# Patient Record
Sex: FEMALE | Race: BLACK OR AFRICAN AMERICAN | Employment: FULL TIME | URBAN - METROPOLITAN AREA
[De-identification: names, ages, dates, MRNs, and addresses within clinical notes are randomized per-mention and may not be internally consistent; named-entity substitution may affect disease eponyms.]

---

## 2021-03-01 ENCOUNTER — OFFICE VISIT (OUTPATIENT)
Dept: SURGERY | Age: 66
End: 2021-03-01
Payer: COMMERCIAL

## 2021-03-01 VITALS
HEART RATE: 76 BPM | SYSTOLIC BLOOD PRESSURE: 139 MMHG | BODY MASS INDEX: 35.85 KG/M2 | HEIGHT: 64 IN | WEIGHT: 210 LBS | DIASTOLIC BLOOD PRESSURE: 79 MMHG | TEMPERATURE: 96.9 F

## 2021-03-01 DIAGNOSIS — N60.99 ATYPICAL HYPERPLASIA OF BREAST: Primary | ICD-10-CM

## 2021-03-01 PROCEDURE — 99243 OFF/OP CNSLTJ NEW/EST LOW 30: CPT | Performed by: SURGERY

## 2021-03-01 PROCEDURE — 76642 ULTRASOUND BREAST LIMITED: CPT | Performed by: SURGERY

## 2021-03-01 RX ORDER — BISMUTH SUBSALICYLATE 262 MG
1 TABLET,CHEWABLE ORAL DAILY
COMMUNITY
End: 2021-09-27

## 2021-03-01 NOTE — PATIENT INSTRUCTIONS
Mammogram: About This Test 
What is it? A mammogram is an X-ray of the breast that is used to screen for breast cancer. This test can find tumors that are too small for you or your doctor to feel. Cancer is most easily treated when it is found at an early stage. Why is this test done? A mammogram is done to: 
· Look for breast cancer in women who don't have symptoms. · Find breast cancer in women who have symptoms. Symptoms of breast cancer may include a lump or thickening in the breast, nipple discharge, or dimpling of the skin on one area of the breast. 
· Find an area of suspicious breast tissue to remove for an exam under a microscope (biopsy). How do you prepare for the test? 
If you've had a mammogram before at another clinic, have the results sent or bring them with you to your appointment. On the day of the mammogram, don't use any deodorant. And don't use perfume, powders, or ointments near or on your breasts. The residue left on your skin by these substances may interfere with the X-rays. How is the test done? · You will need to take off any jewelry that might interfere with the X-ray pictures. · You will need to take off your clothes above the waist. 
· You will be given a cloth or paper gown to use during the test. 
· You probably will stand during the mammogram. 
· One at a time, your breasts will be placed on a flat plate. · Another plate is then pressed firmly against your breast to help flatten out the breast tissue. You may be asked to lift your arm. · For a few seconds while the X-ray picture is being taken, you will need to hold your breath. · At least two pictures are taken of each breast. One is taken from the top and one from the side. How does having a mammogram feel? A mammogram is often uncomfortable but rarely painful.  If you have sensitive or fragile skin or a skin condition, let the technician know before you have your exam. If you have menstrual periods, the procedure is more comfortable when done within 2 weeks after your period has ended. Having your breasts flattened is usually uncomfortable, but it helps the technician get the best images. How long does the test take? · The test will take about 10 to 15 minutes. You may be in the clinic for up to an hour. · You may be asked to wait a few minutes while the images are checked to make sure they don't need to be redone. What happens after the test? 
· You will probably be able to go home right away. · You can go back to your usual activities right away. Follow-up care is a key part of your treatment and safety. Be sure to make and go to all appointments, and call your doctor if you are having problems. It's also a good idea to keep a list of the medicines you take. Ask your doctor when you can expect to have your test results. Where can you learn more? Go to http://www.gray.com/ Enter Y978 in the search box to learn more about \"Mammogram: About This Test.\" Current as of: April 29, 2020               Content Version: 12.6 © 1689-1059 "[employer]. Care instructions adapted under license by [employer]. (which disclaims liability or warranty for this information). If you have questions about a medical condition or this instruction, always ask your healthcare professional. [address] any warranty or liability for your use of this information.

## 2021-03-01 NOTE — LETTER
3/24/2021    Patient: Dino Pack   YOB: 1955   Date of Visit: 3/1/2021     Keenan Dexter MD  [address] [facility] [facility]  [address]  [facility] [address]  Via Fax: [phone]     Merle Gray MD  [address] [address]  Via [address] [employer]    Dear MD Merle Perez MD,      Thank you for referring Ms. Alise Jones to [address] [facility] OFF[facility] [address] [place] [address] for evaluation. My notes for this consultation are attached. If you have questions, please do not hesitate to call me. I look forward to following your patient along with you.       Sincerely,    Sukhi Machado MD

## 2021-03-01 NOTE — PROGRESS NOTES
HISTORY OF PRESENT ILLNESS  Jordan Caba is a 73 y.o. female. HPI NEW Patient presents for consultation at the request of Dr. Art Tian for RIGHT breast atypical intraductal papillary lesion. She did not have any abnormal breast symptoms to report. This area was detected on mammogram which led to [address] and subsequent biopsy. Biopsy site is healing. History of RIGHT breast biopsy done in 2019, benign per patient. 1/29/2021 - RIGHT breast biopsy revealed atypical intraductal papillary lesion. Family history-    Breast imaging-  1/21/2021 - RIGHT breast US done at [facility]: BI-RADS 4a. 1.1 cm complex cystic mass in the right breast is at a low suspicion for malignancy. An ultrasound biopsy is recommended. 1/13/2021 - screening mammogram at [facility]: BI-RADS 0. 1 cm oval focal asymmetry in the right breast resembles a cyst or a post-procedural seroma (given biopsy close to this location) is indeterminate. An ultrasound is recommended. History reviewed. No pertinent past medical history. History reviewed. No pertinent surgical history.   Social History     Socioeconomic History    Marital status:      Spouse name: Not on file    Number of children: Not on file    Years of education: Not on file    Highest education level: Not on file   Occupational History    Not on file   Social Needs    Financial resource strain: Not on file    Food insecurity     Worry: Not on file     Inability: Not on file    Transportation needs     Medical: Not on file     Non-medical: Not on file   Tobacco Use    Smoking status: Never Smoker    Smokeless tobacco: Never Used   Substance and Sexual Activity    Alcohol use: Not Currently     Frequency: Never     Binge frequency: Never    Drug use: Not on file    Sexual activity: Not on file   Lifestyle    Physical activity     Days per week: Not on file     Minutes per session: Not on file    Stress: Not on file   Relationships    Social connections     Talks on phone: Not on file     Gets together: Not on file     Attends [employer]ish service: Not on file     Active member of club or organization: Not on file     Attends meetings of clubs or organizations: Not on file     Relationship status: Not on file    Intimate partner violence     Fear of current or ex partner: Not on file     Emotionally abused: Not on file     Physically abused: Not on file     Forced sexual activity: Not on file   Other Topics Concern    Not on file   Social History Narrative    Not on file     OB History    No obstetric history on file. Obstetric Comments   Menarche:  16. LMP: ? .  # of Children:  3. Age at Delivery of First Child:  23.   Hysterectomy/oophorectomy:  NO/NO. Breast Bx:  Yes right breast 2019. Hx of Breast Feeding:  no. BCP:  yes. Hormone therapy:  no.                  Current Outpatient Medications:     multivitamin (ONE A DAY) tablet, Take 1 Tab by mouth daily. , Disp: , Rfl:     cholecalciferol, vitamin D3, (VITAMIN D3 PO), Take  by mouth., Disp: , Rfl:     flaxseed oil (OMEGA 3 PO), Take  by mouth., Disp: , Rfl:   No current facility-administered medications for this visit. Allergies   Allergen Reactions    Pcn [Penicillins] Rash    Seafood Rash     Review of Systems   Constitutional: Negative. HENT: Negative. Eyes: Negative. Respiratory: Negative. Cardiovascular: Negative. Gastrointestinal: Negative. Genitourinary: Negative. Musculoskeletal: Negative. Skin: Negative. Neurological: Negative. Endo/Heme/Allergies: Negative. Psychiatric/Behavioral: Negative. All other systems reviewed and are negative. Physical Exam  Vitals signs and nursing note reviewed. Constitutional:       Appearance: She is well-developed. HENT:      Head: Normocephalic. Neck:      Musculoskeletal: Neck supple. Thyroid: No thyromegaly. Cardiovascular:      Rate and Rhythm: Normal rate and regular rhythm. Heart sounds: Normal heart sounds. Pulmonary:      Effort: Pulmonary effort is normal.      Breath sounds: Normal breath sounds. Chest:      Breasts: Breasts are symmetrical.         Right: No inverted nipple, mass, nipple discharge, skin change or tenderness. Left: No inverted nipple, mass, nipple discharge, skin change or tenderness. Abdominal:      Palpations: Abdomen is soft. Musculoskeletal: Normal range of motion. Lymphadenopathy:      Cervical: No cervical adenopathy. Skin:     General: Skin is warm and dry. Neurological:      Mental Status: She is alert and oriented to person, place, and time. BREAST ULTRASOUND, Preop planning  Indication:preop planning  right Breast 9:00   Technique: The area was scanned using a high-frequency linear-array near-field transducer  Findings: most recent clip see   Impression: Biopsy site visible with ultrasound  Disposition:  Will schedule mri    ASSESSMENT and PLAN    ICD-10-CM ICD-9-CM    1. Atypical hyperplasia of breast  N60.99 611.1      74 yo female with right breast atypical papillary lesion  Had biopsy before that was also a papilloma. Will get breast mri  Both need excision. I will call her after mri  She was happy with plan  40 minutes was spent with patient on counseling and coordination of care.

## 2021-03-04 ENCOUNTER — TELEPHONE (OUTPATIENT)
Dept: SURGERY | Age: 66
End: 2021-03-04

## 2021-03-04 NOTE — TELEPHONE ENCOUNTER
Returned pt's call. Sounds like she is playing phone tag with Dr. Reggie Aparicio. No answer. I left her a message with office call back # and also advised that she try [employer] to message us.

## 2021-03-05 ENCOUNTER — DOCUMENTATION ONLY (OUTPATIENT)
Dept: SURGERY | Age: 66
End: 2021-03-05

## 2021-03-05 NOTE — PROGRESS NOTES
Brought outside breast imaging CDs ([phone] Sharath Yang) up to [facility] to be uploaded into pacs.

## 2021-03-23 ENCOUNTER — HOSPITAL ENCOUNTER (OUTPATIENT)
Dept: MRI IMAGING | Age: 66
Discharge: HOME | End: 2021-03-23
Attending: SURGERY
Payer: COMMERCIAL

## 2021-03-23 VITALS — WEIGHT: 218 LBS | BODY MASS INDEX: 37.42 KG/M2

## 2021-03-23 DIAGNOSIS — Z91.89 AT HIGH RISK FOR BREAST CANCER: ICD-10-CM

## 2021-03-23 DIAGNOSIS — N60.99 ATYPICAL HYPERPLASIA OF BREAST: ICD-10-CM

## 2021-03-23 PROCEDURE — 74011250636 HC RX REV CODE- 250/636: Performed by: SURGERY

## 2021-03-23 PROCEDURE — 74011000258 HC RX REV CODE- 258: Performed by: SURGERY

## 2021-03-23 PROCEDURE — A9585 GADOBUTROL INJECTION: HCPCS | Performed by: SURGERY

## 2021-03-23 PROCEDURE — 77030021566 MRI BREAST BI W WO CONT

## 2021-03-23 RX ORDER — SODIUM CHLORIDE 0.9 % (FLUSH) 0.9 %
10 SYRINGE (ML) INJECTION
Status: COMPLETED | OUTPATIENT
Start: 2021-03-23 | End: 2021-03-23

## 2021-03-23 RX ADMIN — SODIUM CHLORIDE 100 ML: 900 INJECTION, SOLUTION INTRAVENOUS at 13:37

## 2021-03-23 RX ADMIN — GADOBUTROL 10 ML: 604.72 INJECTION INTRAVENOUS at 13:37

## 2021-03-23 RX ADMIN — Medication 10 ML: at 13:38

## 2021-03-29 NOTE — PROGRESS NOTES
Spoke with patient   2 papillomas right   Spoke with bruno castillo  Clips 2 cm apart  Recommend magseed excisional biopsy x 2 through one incision  Patient was happy with plan.

## 2021-04-14 ENCOUNTER — DOCUMENTATION ONLY (OUTPATIENT)
Dept: SURGERY | Age: 66
End: 2021-04-14

## 2021-04-14 DIAGNOSIS — N60.99 BREAST DUCTAL HYPERPLASIA, ATYPICAL: Primary | ICD-10-CM

## 2021-04-15 DIAGNOSIS — N60.99 DUCTAL HYPERPLASIA, ATYPICAL, BREAST: Primary | ICD-10-CM

## 2021-04-19 ENCOUNTER — HOSPITAL ENCOUNTER (OUTPATIENT)
Dept: PREADMISSION TESTING | Age: 66
Discharge: HOME | End: 2021-04-19
Payer: COMMERCIAL

## 2021-04-19 VITALS
SYSTOLIC BLOOD PRESSURE: 114 MMHG | TEMPERATURE: 98 F | BODY MASS INDEX: 37.15 KG/M2 | HEART RATE: 82 BPM | RESPIRATION RATE: 16 BRPM | WEIGHT: 217.59 LBS | HEIGHT: 64 IN | DIASTOLIC BLOOD PRESSURE: 76 MMHG

## 2021-04-19 LAB
BASOPHILS # BLD: 0 K/UL (ref 0–0.1)
BASOPHILS NFR BLD: 0 % (ref 0–1)
DIFFERENTIAL METHOD BLD: NORMAL
EOSINOPHIL # BLD: 0.1 K/UL (ref 0–0.4)
EOSINOPHIL NFR BLD: 2 % (ref 0–7)
ERYTHROCYTE [DISTWIDTH] IN BLOOD BY AUTOMATED COUNT: 13.8 % (ref 11.5–14.5)
HCT VFR BLD AUTO: 39.5 % (ref 35–47)
HGB BLD-MCNC: 12.4 G/DL (ref 11.5–16)
IMM GRANULOCYTES # BLD AUTO: 0 K/UL (ref 0–0.04)
IMM GRANULOCYTES NFR BLD AUTO: 0 % (ref 0–0.5)
LYMPHOCYTES # BLD: 1.5 K/UL (ref 0.8–3.5)
LYMPHOCYTES NFR BLD: 25 % (ref 12–49)
MCH RBC QN AUTO: 29.5 PG (ref 26–34)
MCHC RBC AUTO-ENTMCNC: 31.4 G/DL (ref 30–36.5)
MCV RBC AUTO: 93.8 FL (ref 80–99)
MONOCYTES # BLD: 0.5 K/UL (ref 0–1)
MONOCYTES NFR BLD: 9 % (ref 5–13)
NEUTS SEG # BLD: 3.7 K/UL (ref 1.8–8)
NEUTS SEG NFR BLD: 64 % (ref 32–75)
NRBC # BLD: 0 K/UL (ref 0–0.01)
NRBC BLD-RTO: 0 PER 100 WBC
PLATELET # BLD AUTO: 177 K/UL (ref 150–400)
PMV BLD AUTO: 11.5 FL (ref 8.9–12.9)
RBC # BLD AUTO: 4.21 M/UL (ref 3.8–5.2)
WBC # BLD AUTO: 5.8 K/UL (ref 3.6–11)

## 2021-04-19 PROCEDURE — 85025 COMPLETE CBC W/AUTO DIFF WBC: CPT

## 2021-04-19 PROCEDURE — 36415 COLL VENOUS BLD VENIPUNCTURE: CPT

## 2021-04-19 NOTE — PERIOP NOTES
Patient given surgical site infection FAQs handout and hand hygiene tips sheet. Pre-operative instructions reviewed and patient verbalizes understanding of instructions. Patient has been given the opportunity to ask additional questions. Pt given 2 bottles of CHG soap and instructed in use. Kassandra Mcnamara Pt instructed that they will be scheduled for COVID 19 test 4 days prior to surgery. COVID instruction sheet and instructions given to PT. Pt instructed they must self quarantine between  COVID 19 test and day of surgery. Parking deck instructions  given to patient. Instructions reviewed with patient.

## 2021-04-23 ENCOUNTER — TRANSCRIBE ORDER (OUTPATIENT)
Dept: REGISTRATION | Age: 66
End: 2021-04-23

## 2021-04-23 ENCOUNTER — HOSPITAL ENCOUNTER (OUTPATIENT)
Dept: MAMMOGRAPHY | Age: 66
Discharge: HOME | End: 2021-04-23
Attending: SURGERY
Payer: COMMERCIAL

## 2021-04-23 ENCOUNTER — HOSPITAL ENCOUNTER (OUTPATIENT)
Dept: PREADMISSION TESTING | Age: 66
Discharge: HOME | End: 2021-04-23
Payer: COMMERCIAL

## 2021-04-23 DIAGNOSIS — Z01.812 PRE-PROCEDURE LAB EXAM: ICD-10-CM

## 2021-04-23 DIAGNOSIS — Z01.812 PRE-PROCEDURE LAB EXAM: Primary | ICD-10-CM

## 2021-04-23 DIAGNOSIS — R92.8 ABNORMAL MAMMOGRAM: ICD-10-CM

## 2021-04-23 PROCEDURE — A4648 IMPLANTABLE TISSUE MARKER: HCPCS

## 2021-04-23 PROCEDURE — 19283 PERQ DEV BREAST 1ST STRTCTC: CPT

## 2021-04-23 PROCEDURE — 74011000250 HC RX REV CODE- 250: Performed by: RADIOLOGY

## 2021-04-23 PROCEDURE — U0003 INFECTIOUS AGENT DETECTION BY NUCLEIC ACID (DNA OR RNA); SEVERE ACUTE RESPIRATORY SYNDROME CORONAVIRUS 2 (SARS-COV-2) (CORONAVIRUS DISEASE [COVID-19]), AMPLIFIED PROBE TECHNIQUE, MAKING USE OF HIGH THROUGHPUT TECHNOLOGIES AS DESCRIBED BY CMS-2020-01-R: HCPCS

## 2021-04-23 RX ORDER — LIDOCAINE HYDROCHLORIDE 10 MG/ML
10 INJECTION INFILTRATION; PERINEURAL
Status: COMPLETED | OUTPATIENT
Start: 2021-04-23 | End: 2021-04-23

## 2021-04-23 RX ADMIN — LIDOCAINE HYDROCHLORIDE 10 ML: 10 INJECTION, SOLUTION INFILTRATION; PERINEURAL at 10:30

## 2021-04-23 NOTE — PROGRESS NOTES
Patient tolerated right breast mag seed placement x 2 well with scant bleeding. Sites were covered with gauze and tegaderm. Patient was instructed to keep the area clean and dry for 24 hours. Other care instructions were reviewed with the patient and she was provided with a written copy as well. Encouraged her to call with any questions or concerns.

## 2021-04-24 LAB — SARS-COV-2, COV2NT: NOT DETECTED

## 2021-04-27 ENCOUNTER — ANESTHESIA EVENT (OUTPATIENT)
Dept: MEDSURG UNIT | Age: 66
End: 2021-04-27
Payer: COMMERCIAL

## 2021-04-27 ENCOUNTER — ANESTHESIA (OUTPATIENT)
Dept: MEDSURG UNIT | Age: 66
End: 2021-04-27
Payer: COMMERCIAL

## 2021-04-27 ENCOUNTER — APPOINTMENT (OUTPATIENT)
Dept: MAMMOGRAPHY | Age: 66
End: 2021-04-27
Attending: SURGERY
Payer: COMMERCIAL

## 2021-04-27 ENCOUNTER — HOSPITAL ENCOUNTER (OUTPATIENT)
Age: 66
Setting detail: OUTPATIENT SURGERY
Discharge: HOME | End: 2021-04-27
Attending: SURGERY | Admitting: SURGERY
Payer: COMMERCIAL

## 2021-04-27 VITALS
BODY MASS INDEX: 37.05 KG/M2 | OXYGEN SATURATION: 98 % | WEIGHT: 217 LBS | TEMPERATURE: 97.9 F | HEIGHT: 64 IN | HEART RATE: 72 BPM | SYSTOLIC BLOOD PRESSURE: 118 MMHG | DIASTOLIC BLOOD PRESSURE: 80 MMHG | RESPIRATION RATE: 18 BRPM

## 2021-04-27 DIAGNOSIS — N60.99 BREAST DUCTAL HYPERPLASIA, ATYPICAL: ICD-10-CM

## 2021-04-27 DIAGNOSIS — D24.1 PAPILLOMA OF RIGHT BREAST: Primary | ICD-10-CM

## 2021-04-27 PROCEDURE — 77030031139 HC SUT VCRL2 J&J -A: Performed by: SURGERY

## 2021-04-27 PROCEDURE — 76030000001 HC AMB SURG OR TIME 1 TO 1.5: Performed by: SURGERY

## 2021-04-27 PROCEDURE — 77030002933 HC SUT MCRYL J&J -A: Performed by: SURGERY

## 2021-04-27 PROCEDURE — C9290 INJ, BUPIVACAINE LIPOSOME: HCPCS | Performed by: SURGERY

## 2021-04-27 PROCEDURE — 74011250636 HC RX REV CODE- 250/636: Performed by: SURGERY

## 2021-04-27 PROCEDURE — 77030041680 HC PNCL ELECSURG SMK EVAC CNMD -B: Performed by: SURGERY

## 2021-04-27 PROCEDURE — 77030008684 HC TU ET CUF COVD -B: Performed by: ANESTHESIOLOGY

## 2021-04-27 PROCEDURE — 74011250636 HC RX REV CODE- 250/636: Performed by: ANESTHESIOLOGY

## 2021-04-27 PROCEDURE — 76210000034 HC AMBSU PH I REC 0.5 TO 1 HR: Performed by: SURGERY

## 2021-04-27 PROCEDURE — 88341 IMHCHEM/IMCYTCHM EA ADD ANTB: CPT

## 2021-04-27 PROCEDURE — 19125 EXCISION BREAST LESION: CPT | Performed by: SURGERY

## 2021-04-27 PROCEDURE — 77030040922 HC BLNKT HYPOTHRM STRY -A

## 2021-04-27 PROCEDURE — 74011000250 HC RX REV CODE- 250: Performed by: NURSE ANESTHETIST, CERTIFIED REGISTERED

## 2021-04-27 PROCEDURE — 74011250636 HC RX REV CODE- 250/636: Performed by: NURSE ANESTHETIST, CERTIFIED REGISTERED

## 2021-04-27 PROCEDURE — 88342 IMHCHEM/IMCYTCHM 1ST ANTB: CPT

## 2021-04-27 PROCEDURE — 2709999900 HC NON-CHARGEABLE SUPPLY: Performed by: SURGERY

## 2021-04-27 PROCEDURE — 77030026438 HC STYL ET INTUB CARD -A: Performed by: ANESTHESIOLOGY

## 2021-04-27 PROCEDURE — 2709999900 HC NON-CHARGEABLE SUPPLY

## 2021-04-27 PROCEDURE — 88307 TISSUE EXAM BY PATHOLOGIST: CPT

## 2021-04-27 PROCEDURE — 76060000062 HC AMB SURG ANES 1 TO 1.5 HR: Performed by: SURGERY

## 2021-04-27 PROCEDURE — 77030040361 HC SLV COMPR DVT MDII -B: Performed by: SURGERY

## 2021-04-27 PROCEDURE — 88360 TUMOR IMMUNOHISTOCHEM/MANUAL: CPT

## 2021-04-27 PROCEDURE — 77030011267 HC ELECTRD BLD COVD -A: Performed by: SURGERY

## 2021-04-27 PROCEDURE — 74011000250 HC RX REV CODE- 250: Performed by: SURGERY

## 2021-04-27 PROCEDURE — 77030010507 HC ADH SKN DERMBND J&J -B: Performed by: SURGERY

## 2021-04-27 RX ORDER — SODIUM CHLORIDE 0.9 % (FLUSH) 0.9 %
5-40 SYRINGE (ML) INJECTION EVERY 8 HOURS
Status: DISCONTINUED | OUTPATIENT
Start: 2021-04-27 | End: 2021-04-27 | Stop reason: HOSPADM

## 2021-04-27 RX ORDER — HYDROMORPHONE HYDROCHLORIDE 1 MG/ML
0.2 INJECTION, SOLUTION INTRAMUSCULAR; INTRAVENOUS; SUBCUTANEOUS
Status: DISCONTINUED | OUTPATIENT
Start: 2021-04-27 | End: 2021-04-27 | Stop reason: HOSPADM

## 2021-04-27 RX ORDER — DEXAMETHASONE SODIUM PHOSPHATE 4 MG/ML
INJECTION, SOLUTION INTRA-ARTICULAR; INTRALESIONAL; INTRAMUSCULAR; INTRAVENOUS; SOFT TISSUE AS NEEDED
Status: DISCONTINUED | OUTPATIENT
Start: 2021-04-27 | End: 2021-04-27 | Stop reason: HOSPADM

## 2021-04-27 RX ORDER — PHENYLEPHRINE HCL IN 0.9% NACL 0.4MG/10ML
SYRINGE (ML) INTRAVENOUS AS NEEDED
Status: DISCONTINUED | OUTPATIENT
Start: 2021-04-27 | End: 2021-04-27 | Stop reason: HOSPADM

## 2021-04-27 RX ORDER — MORPHINE SULFATE 2 MG/ML
2 INJECTION, SOLUTION INTRAMUSCULAR; INTRAVENOUS
Status: DISCONTINUED | OUTPATIENT
Start: 2021-04-27 | End: 2021-04-27 | Stop reason: HOSPADM

## 2021-04-27 RX ORDER — ONDANSETRON 2 MG/ML
INJECTION INTRAMUSCULAR; INTRAVENOUS AS NEEDED
Status: DISCONTINUED | OUTPATIENT
Start: 2021-04-27 | End: 2021-04-27 | Stop reason: HOSPADM

## 2021-04-27 RX ORDER — MIDAZOLAM HYDROCHLORIDE 1 MG/ML
INJECTION, SOLUTION INTRAMUSCULAR; INTRAVENOUS AS NEEDED
Status: DISCONTINUED | OUTPATIENT
Start: 2021-04-27 | End: 2021-04-27 | Stop reason: HOSPADM

## 2021-04-27 RX ORDER — SODIUM CHLORIDE 0.9 % (FLUSH) 0.9 %
5-40 SYRINGE (ML) INJECTION AS NEEDED
Status: DISCONTINUED | OUTPATIENT
Start: 2021-04-27 | End: 2021-04-27 | Stop reason: HOSPADM

## 2021-04-27 RX ORDER — PROPOFOL 10 MG/ML
INJECTION, EMULSION INTRAVENOUS AS NEEDED
Status: DISCONTINUED | OUTPATIENT
Start: 2021-04-27 | End: 2021-04-27 | Stop reason: HOSPADM

## 2021-04-27 RX ORDER — SODIUM CHLORIDE, SODIUM LACTATE, POTASSIUM CHLORIDE, CALCIUM CHLORIDE 600; 310; 30; 20 MG/100ML; MG/100ML; MG/100ML; MG/100ML
50 INJECTION, SOLUTION INTRAVENOUS CONTINUOUS
Status: DISCONTINUED | OUTPATIENT
Start: 2021-04-27 | End: 2021-04-27 | Stop reason: HOSPADM

## 2021-04-27 RX ORDER — GLYCOPYRROLATE 0.2 MG/ML
INJECTION INTRAMUSCULAR; INTRAVENOUS AS NEEDED
Status: DISCONTINUED | OUTPATIENT
Start: 2021-04-27 | End: 2021-04-27 | Stop reason: HOSPADM

## 2021-04-27 RX ORDER — LIDOCAINE HYDROCHLORIDE 10 MG/ML
0.1 INJECTION, SOLUTION EPIDURAL; INFILTRATION; INTRACAUDAL; PERINEURAL AS NEEDED
Status: DISCONTINUED | OUTPATIENT
Start: 2021-04-27 | End: 2021-04-27 | Stop reason: HOSPADM

## 2021-04-27 RX ORDER — ROCURONIUM BROMIDE 10 MG/ML
INJECTION, SOLUTION INTRAVENOUS AS NEEDED
Status: DISCONTINUED | OUTPATIENT
Start: 2021-04-27 | End: 2021-04-27 | Stop reason: HOSPADM

## 2021-04-27 RX ORDER — NEOSTIGMINE METHYLSULFATE 1 MG/ML
INJECTION INTRAVENOUS AS NEEDED
Status: DISCONTINUED | OUTPATIENT
Start: 2021-04-27 | End: 2021-04-27 | Stop reason: HOSPADM

## 2021-04-27 RX ORDER — ONDANSETRON 2 MG/ML
4 INJECTION INTRAMUSCULAR; INTRAVENOUS AS NEEDED
Status: DISCONTINUED | OUTPATIENT
Start: 2021-04-27 | End: 2021-04-27 | Stop reason: HOSPADM

## 2021-04-27 RX ORDER — OXYCODONE AND ACETAMINOPHEN 5; 325 MG/1; MG/1
1 TABLET ORAL
Qty: 30 TAB | Refills: 0 | Status: SHIPPED | OUTPATIENT
Start: 2021-04-27 | End: 2021-05-02

## 2021-04-27 RX ORDER — LIDOCAINE HYDROCHLORIDE 20 MG/ML
INJECTION, SOLUTION EPIDURAL; INFILTRATION; INTRACAUDAL; PERINEURAL AS NEEDED
Status: DISCONTINUED | OUTPATIENT
Start: 2021-04-27 | End: 2021-04-27 | Stop reason: HOSPADM

## 2021-04-27 RX ORDER — SUCCINYLCHOLINE CHLORIDE 20 MG/ML
INJECTION INTRAMUSCULAR; INTRAVENOUS AS NEEDED
Status: DISCONTINUED | OUTPATIENT
Start: 2021-04-27 | End: 2021-04-27 | Stop reason: HOSPADM

## 2021-04-27 RX ORDER — FENTANYL CITRATE 50 UG/ML
INJECTION, SOLUTION INTRAMUSCULAR; INTRAVENOUS AS NEEDED
Status: DISCONTINUED | OUTPATIENT
Start: 2021-04-27 | End: 2021-04-27 | Stop reason: HOSPADM

## 2021-04-27 RX ORDER — FENTANYL CITRATE 50 UG/ML
25 INJECTION, SOLUTION INTRAMUSCULAR; INTRAVENOUS
Status: DISCONTINUED | OUTPATIENT
Start: 2021-04-27 | End: 2021-04-27 | Stop reason: HOSPADM

## 2021-04-27 RX ORDER — ONDANSETRON 4 MG/1
4 TABLET, ORALLY DISINTEGRATING ORAL
Qty: 5 TAB | Refills: 1 | Status: SHIPPED | OUTPATIENT
Start: 2021-04-27 | End: 2021-09-27

## 2021-04-27 RX ORDER — OXYCODONE HYDROCHLORIDE 5 MG/1
5 TABLET ORAL
Status: DISCONTINUED | OUTPATIENT
Start: 2021-04-27 | End: 2021-04-27 | Stop reason: HOSPADM

## 2021-04-27 RX ADMIN — ROCURONIUM BROMIDE 20 MG: 10 SOLUTION INTRAVENOUS at 09:39

## 2021-04-27 RX ADMIN — Medication 40 MCG: at 10:19

## 2021-04-27 RX ADMIN — Medication 40 MCG: at 10:24

## 2021-04-27 RX ADMIN — Medication 40 MCG: at 10:11

## 2021-04-27 RX ADMIN — SUCCINYLCHOLINE CHLORIDE 140 MG: 20 INJECTION, SOLUTION INTRAMUSCULAR; INTRAVENOUS at 09:35

## 2021-04-27 RX ADMIN — ONDANSETRON HYDROCHLORIDE 4 MG: 2 INJECTION, SOLUTION INTRAMUSCULAR; INTRAVENOUS at 09:49

## 2021-04-27 RX ADMIN — SODIUM CHLORIDE, POTASSIUM CHLORIDE, SODIUM LACTATE AND CALCIUM CHLORIDE 50 ML/HR: 600; 310; 30; 20 INJECTION, SOLUTION INTRAVENOUS at 08:45

## 2021-04-27 RX ADMIN — PROPOFOL 200 MG: 10 INJECTION, EMULSION INTRAVENOUS at 09:35

## 2021-04-27 RX ADMIN — NEOSTIGMINE METHYLSULFATE 3 MG: 1 INJECTION, SOLUTION INTRAVENOUS at 10:26

## 2021-04-27 RX ADMIN — MIDAZOLAM 2 MG: 1 INJECTION INTRAMUSCULAR; INTRAVENOUS at 09:26

## 2021-04-27 RX ADMIN — FENTANYL CITRATE 100 MCG: 50 INJECTION, SOLUTION INTRAMUSCULAR; INTRAVENOUS at 09:35

## 2021-04-27 RX ADMIN — Medication 40 MCG: at 10:15

## 2021-04-27 RX ADMIN — ROCURONIUM BROMIDE 5 MG: 10 SOLUTION INTRAVENOUS at 09:35

## 2021-04-27 RX ADMIN — LIDOCAINE HYDROCHLORIDE 80 MG: 20 INJECTION, SOLUTION EPIDURAL; INFILTRATION; INTRACAUDAL; PERINEURAL at 09:35

## 2021-04-27 RX ADMIN — GLYCOPYRROLATE 0.4 MG: 0.2 INJECTION, SOLUTION INTRAMUSCULAR; INTRAVENOUS at 10:26

## 2021-04-27 RX ADMIN — DEXAMETHASONE SODIUM PHOSPHATE 4 MG: 4 INJECTION, SOLUTION INTRAMUSCULAR; INTRAVENOUS at 09:49

## 2021-04-27 NOTE — ANESTHESIA PREPROCEDURE EVALUATION
Relevant Problems   No relevant active problems       Anesthetic History   No history of anesthetic complications            Review of Systems / Medical History  Patient summary reviewed, nursing notes reviewed and pertinent labs reviewed    Pulmonary  Within defined limits                 Neuro/Psych   Within defined limits           Cardiovascular                  Exercise tolerance: >4 METS     GI/Hepatic/Renal                Endo/Other        Obesity     Other Findings              Physical Exam    Airway  Mallampati: II  TM Distance: > 6 cm  Neck ROM: normal range of motion   Mouth opening: Normal     Cardiovascular    Rhythm: regular  Rate: normal         Dental    Dentition: Full lower dentures and Full upper dentures     Pulmonary  Breath sounds clear to auscultation               Abdominal         Other Findings            Anesthetic Plan    ASA: 2  Anesthesia type: general          Induction: Intravenous  Anesthetic plan and risks discussed with: Patient

## 2021-04-27 NOTE — DISCHARGE INSTRUCTIONS
Discharge Instructions from Dr. Viridiana Rodríguez    · I will call you with the pathology results, typically within 1 week from today. · You may shower, but no hot tubs, swimming pools, or baths until your incision is healed. · No heavy lifting with the affected extremity (nothing greater than 5 pounds), and limit its use for the next 4-5 days. · You may use an ice pack for comfort for the next couple of days, but do not place ice directly on the skin. Rather, use a towel or clothing to serve as a barrier between skin and ice to prevent injury. · If I placed a drain, follow the drain instructions provided, especially as you keep a record of the drain output. · Follow medication instructions carefully. No aspirin, ibuprofen or aleve. May take tylenol instead of narcotic. · Wear surgical bra and dressing for 24 hours, then remove. Wear supportive bra at all times. · You will have bruising and swelling  · Watch for signs of infection as listed below. · Redness  · Swelling  · Drainage from the incision or from your nipple that appears infected  · Fever over 101.5 degrees for consecutive readings, or over 99.5 if you are currently undergoing chemotherapy. · Call our office (number is below) for a follow-up appointment. · If you have any problems, our phone number is [phone]        DISCHARGE SUMMARY from Nurse    PATIENT INSTRUCTIONS:    After general anesthesia or intravenous sedation, for 24 hours or while taking prescription Narcotics:  · Limit your activities  · Do not drive and operate hazardous machinery  · Do not make important personal or business decisions  · Do  not drink alcoholic beverages  · If you have not urinated within 8 hours after discharge, please contact your surgeon on call.     Report the following to your surgeon:  · Excessive pain, swelling, redness or odor of or around the surgical area  · Temperature over 100.5  · Nausea and vomiting lasting longer than 4 hours or if unable to take medications  · Any signs of decreased circulation or nerve impairment to extremity: change in color, persistent  numbness, tingling, coldness or increase pain  · Any questions    What to do at Home:  Recommended activity: {discharge activity:53608}, ***    If you experience any of the following symptoms ***, please follow up with ***. *  Please give a list of your current medications to your Primary Care Provider. *  Please update this list whenever your medications are discontinued, doses are      changed, or new medications (including over-the-counter products) are added. *  Please carry medication information at all times in case of emergency situations. These are general instructions for a healthy lifestyle:    No smoking/ No tobacco products/ Avoid exposure to second hand smoke  Surgeon General's Warning:  Quitting smoking now greatly reduces serious risk to your health. Obesity, smoking, and sedentary lifestyle greatly increases your risk for illness    A healthy diet, regular physical exercise & weight monitoring are important for maintaining a healthy lifestyle    You may be retaining fluid if you have a history of heart failure or if you experience any of the following symptoms:  Weight gain of 3 pounds or more overnight or 5 pounds in a week, increased swelling in our hands or feet or shortness of breath while lying flat in bed. Please call your doctor as soon as you notice any of these symptoms; do not wait until your next office visit. The discharge information has been reviewed with the {PATIENT PARENT GUARDIAN:31282}. The {PATIENT PARENT GUARDIAN:07670} verbalized understanding. Discharge medications reviewed with the {Dishcarge meds reviewed BOYT:59622} and appropriate educational materials and side effects teaching were provided.   ___________________________________________________________________________________________________________________________________

## 2021-04-27 NOTE — OP NOTES
[address]   OPERATIVE REPORT    Name:  Erica Murphy  MR#:  [id number]  :  1955  ACCOUNT #:  [de-identified]  DATE OF SERVICE:  2021      PREOPERATIVE DIAGNOSES:  Right breast atypical intraductal papillary lesion, papilloma. POSTOPERATIVE DIAGNOSES:  Right breast atypical intraductal papillary lesion, papilloma. PROCEDURE PERFORMED:  Right breast Magseed-guided excisional biopsy x2. SURGEON:  Shanika Parikh MD    ASSISTANT:  Leilani Joseph. ANESTHESIA:  General.    COMPLICATIONS:  None. SPECIMENS REMOVED:  1. Right breast excisional biopsy. 2.  Right breast lateral margin. IMPLANTS:  No implants. ESTIMATED BLOOD LOSS:  Minimal.    DRAINS:  No drains. FINDINGS:  Two Magseed clips in specimen radiograph. INDICATIONS FOR PROCEDURE:  A 79-year-old female who had a papilloma as well as an atypical papillary lesion in the right breast.  She needed these excised due to risk of upgrade. PROCEDURE IN DETAIL:  Initially, the patient went to [facility] where [facility] clip placement was performed. She tolerated this well. She went to the preoperative holding area where surgical site was marked by surgeon. Informed consent was obtained. She was taken to the operating room and laid in supine position where LMA anesthesia was induced. Right breast was prepped and draped in usual fashion and a time-out was performed. Magseed localization wand was used to identify the clips along the 9 o'clock ray. A periareolar incision was made with a 15-blade. Bovie cautery was used to dissect around these lesions as one specimen. This was excised and marked short superior and long stitch lateral and both Magseed clips were confirmed in specimen radiograph. The cavity was irrigated. A mixture of 20 mL of Exparel with 30 mL of 0.25% Marcaine was injected in the breast tissue and skin.   The deeper tissues were closed with interrupted 2-0 Vicryl, the skin with interrupted 3-0 Vicryl and 4-0 subcuticular Monocryl. Skin glue was placed on the incision as well as a dressing and a bra. All sponge, needle, and instrument counts were correct. The patient went to Recovery in stable condition.         MD ETHEL [id number]  D:  04/27/2021 10:35  T:  04/27/2021 11:52  JOB #:  [id number]

## 2021-04-27 NOTE — ANESTHESIA POSTPROCEDURE EVALUATION
Post-Anesthesia Evaluation and Assessment    Patient: Miguel Christianson MRN: [id number]  SSN: [id number]    YOB: 1955  Age: 73 y.o. Sex: female      I have evaluated the patient and they are stable and ready for discharge from the PACU. Cardiovascular Function/Vital Signs  Visit Vitals  /86   Pulse 72   Temp 36.6 °C (97.9 °F)   Resp 16   Ht 5' 4\" (1.626 m)   Wt 98.4 kg (217 lb)   SpO2 94%   BMI 37.25 kg/m²       Patient is status post General anesthesia for Procedure(s):  RIGHT BREAST BIOPSY TIMES TWO WITH MAGSEED. Nausea/Vomiting: None    Postoperative hydration reviewed and adequate. Pain:  Pain Scale 1: Numeric (0 - 10) (04/27/21 1042)  Pain Intensity 1: 0 (04/27/21 1042)   Managed    Neurological Status:   Neuro (WDL): Exceptions to WDL (04/27/21 1042)  Neuro  Neurologic State: Anesthetized;Sleeping;Eyes open to stimulus (04/27/21 1042)  LUE Motor Response: Purposeful (04/27/21 1042)  LLE Motor Response: Purposeful (04/27/21 1042)  RUE Motor Response: Purposeful (04/27/21 1042)  RLE Motor Response: Purposeful (04/27/21 1042)   At baseline    Mental Status, Level of Consciousness: Alert and  oriented to person, place, and time    Pulmonary Status:   O2 Device: Nasal cannula (04/27/21 1044)   Adequate oxygenation and airway patent    Complications related to anesthesia: None    Post-anesthesia assessment completed. No concerns    Signed By: Kandis Salazar MD     April 27, 2021              Procedure(s):  RIGHT BREAST BIOPSY TIMES TWO WITH MAGSEED. general    <BSHSIANPOST>    INITIAL Post-op Vital signs:   Vitals Value Taken Time   /80 04/27/21 1055   Temp 36.6 °C (97.9 °F) 04/27/21 1042   Pulse 68 04/27/21 1100   Resp 21 04/27/21 1100   SpO2 99 % 04/27/21 1100   Vitals shown include unvalidated device data.

## 2021-04-27 NOTE — ACP (ADVANCE CARE PLANNING)
visit for Advance Medical Directive (AMD) consult. 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 Let her know of  support and availability. Please contact [phone] [facility] for further support.      [address] [employer] Augusto French, [facility]   [phone]

## 2021-04-27 NOTE — PROGRESS NOTES
Spiritual Care Assessment/Progress Note  [address]      NAME: Adan Campos      MRN: [id number]  AGE: 73 y.o.  SEX: female  Mosque Affiliation: No preference   Language: English     4/27/2021     Total Time (in minutes): 20     Spiritual Assessment begun in [address] [place] [address] ASU HOLDING through conversation with:         [x]Patient        [] Family    [] Friend(s)        Reason for Consult: Advance medical directive consult     Spiritual beliefs: (Please include comment if needed)     [] Identifies with a aldo tradition:         [] Supported by a aldo community:            [] Claims no spiritual orientation:           [] Seeking spiritual identity:                [] Adheres to an individual form of spirituality:           [x] Not able to assess:                           Identified resources for coping:      [] Prayer                               [] Music                  [] Guided Imagery     [x] Family/friends                 [] Pet visits     [] Devotional reading                         [] Unknown     [] Other:                                              Interventions offered during this visit: (See comments for more details)    Patient Interventions: Advance medical directive consult, Affirmation of emotions/emotional suffering, Normalization of emotional/spiritual concerns           Plan of Care:     [] Support spiritual and/or cultural needs    [] Support AMD and/or advance care planning process      [] Support grieving process   [] Coordinate Rites and/or Rituals    [] Coordination with community clergy   [] No spiritual needs identified at this time   [] Detailed Plan of Care below (See Comments)  [] Make referral to Music Therapy  [] Make referral to Pet Therapy     [] Make referral to Addiction services  [] Make referral to [facility]  [] Make referral to Spiritual Care Partner  [] No future visits requested        [x] Follow up upon further referrals     Comments:  visit for Advance Medical Directive (AMD) consult. 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 Let her know of  support and availability. Please contact [phone] [facility] for further support.      [address] [employer] August[address] French, M[facility]   [phone]

## 2021-04-27 NOTE — BRIEF OP NOTE
Brief Postoperative Note    Patient: Manjula Messina  YOB: 1955  MRN: [id number]    Date of Procedure: 4/27/2021     Pre-Op Diagnosis: RIGHT BREAST ATYPICAL INTRADUCTAL PAPILLARY LEsion     Post-Op Diagnosis: Same as preoperative diagnosis. Procedure(s):  RIGHT BREAST BIOPSY TIMES TWO WITH MAGSEED    Surgeon(s):   Allen Mcconnell MD    Surgical Assistant: Surg Asst-1: Alysha PINO    Anesthesia: General     Estimated Blood Loss (mL): Minimal    Complications: None    Specimens:   ID Type Source Tests Collected by Time Destination   1 : RIGHT BREAST EXCISIONAL BIOPSY Fresh Breast  Allen Mcconnell MD 4/27/2021 6070 Pathology   2 : RIGHT BREAST LATERAL MARGIN Fresh Breast  Allen cMconnell MD 4/27/2021 1001 Pathology        Implants: * No implants in log *    Drains: * No LDAs found *    Findings: 2 magseed clips in specimen    Electronically Signed by Kieran Lewis MD on 4/27/2021 at 10:32 AM  Dictated stat

## 2021-04-27 NOTE — H&P
History and Physical    HISTORY OF PRESENT ILLNESS   Marizol Becerra is a 73 y.o. female. HPI NEW Patient presents for consultation at the request of Dr. Hany Miramontes for RIGHT breast atypical intraductal papillary lesion. She did not have any abnormal breast symptoms to report. This area was detected on mammogram which led to [address] and subsequent biopsy. Biopsy site is healing. History of RIGHT breast biopsy done in 2019, benign per patient. 1/29/2021 - RIGHT breast biopsy revealed atypical intraductal papillary lesion. Family history-   Breast imaging-   1/21/2021 - RIGHT breast US done at [facility]: BI-RADS 4a. 1.1 cm complex cystic mass in the right breast is at a low suspicion for malignancy. An ultrasound biopsy is recommended. 1/13/2021 - screening mammogram at [facility]: BI-RADS 0. 1 cm oval focal asymmetry in the right breast resembles a cyst or a post-procedural seroma (given biopsy close to this location) is indeterminate. An ultrasound is recommended. History reviewed. No pertinent past medical history. History reviewed. No pertinent surgical history.    Social History           Socioeconomic History    Marital status:      Spouse name: Not on file    Number of children: Not on file    Years of education: Not on file    Highest education level: Not on file   Occupational History    Not on file   Social Needs    Financial resource strain: Not on file    Food insecurity     Worry: Not on file     Inability: Not on file    Transportation needs     Medical: Not on file     Non-medical: Not on file   Tobacco Use    Smoking status: Never Smoker    Smokeless tobacco: Never Used   Substance and Sexual Activity    Alcohol use: Not Currently     Frequency: Never     Binge frequency: Never    Drug use: Not on file    Sexual activity: Not on file   Lifestyle    Physical activity     Days per week: Not on file     Minutes per session: Not on file    Stress: Not on file   Relationships    Social connections     Talks on phone: Not on file     Gets together: Not on file     Attends Faith service: Not on file     Active member of club or organization: Not on file     Attends meetings of clubs or organizations: Not on file     Relationship status: Not on file    Intimate partner violence     Fear of current or ex partner: Not on file     Emotionally abused: Not on file     Physically abused: Not on file     Forced sexual activity: Not on file   Other Topics Concern    Not on file   Social History Narrative    Not on file     OB History    No obstetric history on file. Obstetric Comments    Menarche: 16. LMP: ? . # of Children: 3. Age at Delivery of First Child: 23. Hysterectomy/oophorectomy: NO/NO. Breast Bx: Yes right breast 2019. Hx of Breast Feeding: no. BCP: yes. Hormone therapy: no.             Current Outpatient Medications:    multivitamin (ONE A DAY) tablet, Take 1 Tab by mouth daily. , Disp: , Rfl:    cholecalciferol, vitamin D3, (VITAMIN D3 PO), Take by mouth., Disp: , Rfl:    flaxseed oil (OMEGA 3 PO), Take by mouth., Disp: , Rfl:   No current facility-administered medications for this visit. Allergies   Allergen Reactions    Pcn [Penicillins] Rash    Seafood Rash     Review of Systems   Constitutional: Negative. HENT: Negative. Eyes: Negative. Respiratory: Negative. Cardiovascular: Negative. Gastrointestinal: Negative. Genitourinary: Negative. Musculoskeletal: Negative. Skin: Negative. Neurological: Negative. Endo/Heme/Allergies: Negative. Psychiatric/Behavioral: Negative. All other systems reviewed and are negative. Physical Exam   Vitals signs and nursing note reviewed. Constitutional:   Appearance: She is well-developed. HENT:   Head: Normocephalic. Neck:   Musculoskeletal: Neck supple. Thyroid: No thyromegaly. Cardiovascular:   Rate and Rhythm: Normal rate and regular rhythm. Heart sounds: Normal heart sounds.    Pulmonary: Effort: Pulmonary effort is normal.   Breath sounds: Normal breath sounds. Chest:   Breasts: Breasts are symmetrical.   Right: No inverted nipple, mass, nipple discharge, skin change or tenderness. Left: No inverted nipple, mass, nipple discharge, skin change or tenderness. Abdominal:   Palpations: Abdomen is soft. Musculoskeletal: Normal range of motion. Lymphadenopathy:   Cervical: No cervical adenopathy. Skin:   General: Skin is warm and dry. Neurological:   Mental Status: She is alert and oriented to person, place, and time. BREAST ULTRASOUND, Preop planning   Indication:preop planning right Breast 9:00   Technique: The area was scanned using a high-frequency linear-array near-field transducer   Findings: most recent clip see   Impression: Biopsy site visible with ultrasound   Disposition: Will schedule mri   ASSESSMENT and PLAN     ICD-10-CM ICD-9-CM    1. Atypical hyperplasia of breast  N60.99 611.1      74 yo female with right breast atypical papillary lesion   Had biopsy before that was also a papilloma.     - magseed guided excisional biopsy x 2   For papillomas

## 2021-04-27 NOTE — PERIOP NOTES
Discharge instructions reviewed with patient and her , understanding verbalized of post op care and follow up. Pt voices no c/o's upon discharge. Pt d/c'ed via w/c.

## 2021-04-27 NOTE — ROUTINE PROCESS
Patient: Nae Loco MRN: [id number]  SSN: [id number] YOB: 1955  Age: 73 y.o. Sex: female Patient is status post Procedure(s): RIGHT BREAST BIOPSY TIMES TWO WITH MAGSEED. Surgeon(s) and Role: Terrence Fuchs MD - Primary Local/Dose/Irrigation:  SEE MAR Peripheral IV 04/27/21 Left Hand (Active) Site Assessment Clean, dry, & intact 04/27/21 6035 Phlebitis Assessment 0 04/27/21 0843 Infiltration Assessment 0 04/27/21 0843 Dressing Status Clean, dry, & intact 04/27/21 2985 Dressing Type Transparent 04/27/21 6150 Airway - Endotracheal Tube 04/27/21 Oral (Active) Dressing/Packing:  Incision 04/27/21 Breast Right-Dressing/Treatment: Skin glue;Gauze dressing/dressing sponge(BRA) (04/27/21 1018) Splint/Cast:  ] Other:

## 2021-05-03 NOTE — PROGRESS NOTES
Called patient with surg path  Had papillomas excised  Found dcis  Will send decision rt  Needs re-excision lumpectomy   Will schedule.

## 2021-05-04 ENCOUNTER — DOCUMENTATION ONLY (OUTPATIENT)
Dept: SURGERY | Age: 66
End: 2021-05-04

## 2021-05-06 DIAGNOSIS — D05.91 CARCINOMA IN SITU OF RIGHT BREAST, UNSPECIFIED TYPE: Primary | ICD-10-CM

## 2021-05-10 ENCOUNTER — OFFICE VISIT (OUTPATIENT)
Dept: SURGERY | Age: 66
End: 2021-05-10
Payer: MEDICARE

## 2021-05-10 VITALS — DIASTOLIC BLOOD PRESSURE: 84 MMHG | HEART RATE: 78 BPM | SYSTOLIC BLOOD PRESSURE: 146 MMHG

## 2021-05-10 DIAGNOSIS — D05.11 DUCTAL CARCINOMA IN SITU (DCIS) OF RIGHT BREAST: Primary | ICD-10-CM

## 2021-05-10 PROCEDURE — 99024 POSTOP FOLLOW-UP VISIT: CPT | Performed by: SURGERY

## 2021-05-10 NOTE — PROGRESS NOTES
HISTORY OF PRESENT ILLNESS  Velia Mei is a 73 y.o. female. HPI ESTABLISHED patient here for post op follow up, s/p RIGHT excisional biopsy/lumpectomy. RIGHT breast feels a little swollen. Incision dry and intact. No redness. Denies pain. 5/25/21 - Re-excision lumpectomy scheduled. Breast cancer history:  4/27/21 - RIGHT breast excisional biopsy/lumpectomy -  Had papillomas excised  Found dcis  DCISRT pending  Needs re-excision lumpectomy      1/29/2021 - RIGHT breast biopsy revealed atypical intraductal papillary lesion. History of RIGHT breast biopsy done in 2019, benign per patient. Review of Systems   All other systems reviewed and are negative. Physical Exam  Vitals signs and nursing note reviewed. Chest:          Comments: Right breast incision healing well         ASSESSMENT and PLAN    ICD-10-CM ICD-9-CM    1.  Ductal carcinoma in situ (DCIS) of right breast  D05.11 233.0      - healing well  Will do re-excision  Had excisional biopsy for papilloma   dcis on surg path  With + margins will re-exise

## 2021-05-10 NOTE — PATIENT INSTRUCTIONS
Breast Cancer: Care Instructions Your Care Instructions Breast cancer occurs when abnormal cells grow out of control in the breast. These cancer cells can spread within the breast, to nearby lymph nodes and other tissues, and to other parts of the body. Being treated for cancer can weaken your body, and you may feel very tired. Get the rest your body needs so you can feel better. Finding out that you have cancer is scary. You may feel many emotions and may need some help coping. Seek out family, friends, and counselors for support. You also can do things at home to make yourself feel better while you go through treatment. Call the [employer] (7-210.543.7382) or visit its website at [employer] for more information. Follow-up care is a key part of your treatment and safety. Be sure to make and go to all appointments, and call your doctor if you are having problems. It's also a good idea to know your test results and keep a list of the medicines you take. How can you care for yourself at home? · Take your medicines exactly as prescribed. Call your doctor if you think you are having a problem with your medicine. You may get medicine for nausea and vomiting if you have these side effects. · Follow your doctor's instructions to relieve pain. Pain from cancer and surgery can almost always be controlled. Use pain medicine when you first notice pain, before it becomes severe. · Eat healthy food. If you do not feel like eating, try to eat food that has protein and extra calories to keep up your strength and prevent weight loss. Drink liquid meal replacements for extra calories and protein. Try to eat your main meal early. · Get some physical activity every day, but do not get too tired. Keep doing the hobbies you enjoy as your energy allows. · Do not smoke. Smoking can make your cancer worse. If you need help quitting, talk to your doctor about stop-smoking programs and medicines.  These can increase your chances of quitting for good. · Take steps to control your stress and workload. Learn relaxation techniques. ? Share your feelings. Stress and tension affect our emotions. By expressing your feelings to others, you may be able to understand and cope with them. ? Consider joining a support group. Talking about a problem with your spouse, a good friend, or other people with similar problems is a good way to reduce tension and stress. ? Express yourself through art. Try writing, crafts, dance, or art to relieve stress. Some dance, writing, or art groups may be available just for people who have cancer. ? Be kind to your body and mind. Getting enough sleep, eating a healthy diet, and taking time to do things you enjoy can contribute to an overall feeling of balance in your life and can help reduce stress. ? Get help if you need it. Discuss your concerns with your doctor or counselor. · If you are vomiting or have diarrhea: ? Drink plenty of fluids to prevent dehydration. Choose water and other caffeine-free clear liquids. If you have kidney, heart, or liver disease and have to limit fluids, talk with your doctor before you increase the amount of fluids you drink. ? When you are able to eat, try clear soups, mild foods, and liquids until all symptoms are gone for 12 to 48 hours. Other good choices include dry toast, crackers, cooked cereal, and gelatin dessert, such as Jell-O. · If you have not already done so, prepare a list of advance directives. Advance directives are instructions to your doctor and family members about what kind of care you want if you become unable to speak or express yourself. When should you call for help? Call 911 anytime you think you may need emergency care. For example, call if: 
  · You passed out (lost consciousness).   
Call your doctor now or seek immediate medical care if: 
  · You have a fever.  
  · You have abnormal bleeding.  
  · You think you have an infection.  
  · You have new or worse pain.  
  · You have new symptoms, such as a cough, belly pain, vomiting, diarrhea, or a rash. Watch closely for changes in your health, and be sure to contact your doctor if: 
  · You are much more tired than usual.  
  · You have swollen glands in your armpits, groin, or neck.  
  · You do not get better as expected. Where can you learn more? Go to http://www.[employer].com/ Enter V321 in the search box to learn more about \"Breast Cancer: Care Instructions. \" Current as of: December 17, 2020               Content Version: 12.8 © 4497-3081 [employer]. Care instructions adapted under license by [employer] (which disclaims liability or warranty for this information). If you have questions about a medical condition or this instruction, always ask your healthcare professional. [address] any warranty or liability for your use of this information.

## 2021-05-18 ENCOUNTER — HOSPITAL ENCOUNTER (OUTPATIENT)
Dept: PREADMISSION TESTING | Age: 66
Discharge: HOME | End: 2021-05-18

## 2021-05-18 VITALS
DIASTOLIC BLOOD PRESSURE: 84 MMHG | TEMPERATURE: 98 F | BODY MASS INDEX: 37.64 KG/M2 | SYSTOLIC BLOOD PRESSURE: 137 MMHG | WEIGHT: 220.46 LBS | HEIGHT: 64 IN | HEART RATE: 72 BPM

## 2021-05-18 NOTE — PERIOP NOTES
PREOPERATIVE INSTRUCTIONS REVIEWED WITH PATIENT. PATIENT GIVEN TWO-- BOTTLES OF CHG SOAPS  INSTRUCTIONS REVIEWED ON USE OF CHG SOAPS   PATIENT GIVEN SSI INFECTIONS SHEET. PATIENT WAS GIVEN THE OPPORTUNITY TO ASK QUESTIONS ON THE INFORMATION PROVIDED. PT  MADE AWARE OF COVID 19 TESTING NEEDED TO BE DONE WITHIN 96 HOURS OF SURGERY. PT INSTRUCTED ON SELF QUARANTINE  BETWEEN TESTING AND ARRIVAL TIME  ON DAY OF SURGERY. INSTRUCTION PROVIDED FOR CELL PHONE WAITING AREA, PT INFORMATION AND INSTRUCTIONS FOR APPOINTMENTS AT [facility] ON DAY OF SURGERY.

## 2021-05-21 ENCOUNTER — HOSPITAL ENCOUNTER (OUTPATIENT)
Dept: PREADMISSION TESTING | Age: 66
Discharge: HOME | End: 2021-05-21
Payer: COMMERCIAL

## 2021-05-21 ENCOUNTER — TRANSCRIBE ORDER (OUTPATIENT)
Dept: REGISTRATION | Age: 66
End: 2021-05-21

## 2021-05-21 DIAGNOSIS — Z01.812 PRE-PROCEDURE LAB EXAM: ICD-10-CM

## 2021-05-21 DIAGNOSIS — Z01.812 PRE-PROCEDURE LAB EXAM: Primary | ICD-10-CM

## 2021-05-21 PROCEDURE — U0003 INFECTIOUS AGENT DETECTION BY NUCLEIC ACID (DNA OR RNA); SEVERE ACUTE RESPIRATORY SYNDROME CORONAVIRUS 2 (SARS-COV-2) (CORONAVIRUS DISEASE [COVID-19]), AMPLIFIED PROBE TECHNIQUE, MAKING USE OF HIGH THROUGHPUT TECHNOLOGIES AS DESCRIBED BY CMS-2020-01-R: HCPCS

## 2021-05-22 LAB — SARS-COV-2, COV2NT: NOT DETECTED

## 2021-05-25 ENCOUNTER — ANESTHESIA EVENT (OUTPATIENT)
Dept: MEDSURG UNIT | Age: 66
End: 2021-05-25
Payer: COMMERCIAL

## 2021-05-25 ENCOUNTER — HOSPITAL ENCOUNTER (OUTPATIENT)
Age: 66
Setting detail: OUTPATIENT SURGERY
Discharge: HOME | End: 2021-05-25
Attending: SURGERY | Admitting: SURGERY
Payer: COMMERCIAL

## 2021-05-25 ENCOUNTER — ANESTHESIA (OUTPATIENT)
Dept: MEDSURG UNIT | Age: 66
End: 2021-05-25
Payer: COMMERCIAL

## 2021-05-25 VITALS
HEART RATE: 72 BPM | BODY MASS INDEX: 37.56 KG/M2 | TEMPERATURE: 97.6 F | DIASTOLIC BLOOD PRESSURE: 86 MMHG | OXYGEN SATURATION: 94 % | WEIGHT: 220 LBS | HEIGHT: 64 IN | SYSTOLIC BLOOD PRESSURE: 131 MMHG | RESPIRATION RATE: 18 BRPM

## 2021-05-25 DIAGNOSIS — D05.91 CARCINOMA IN SITU OF RIGHT BREAST, UNSPECIFIED TYPE: ICD-10-CM

## 2021-05-25 PROCEDURE — 77030038213 HC SUPP BRA COMPRSS PSTOP COND -B: Performed by: SURGERY

## 2021-05-25 PROCEDURE — 77030041680 HC PNCL ELECSURG SMK EVAC CNMD -B: Performed by: SURGERY

## 2021-05-25 PROCEDURE — 19301 PARTIAL MASTECTOMY: CPT | Performed by: SURGERY

## 2021-05-25 PROCEDURE — 88307 TISSUE EXAM BY PATHOLOGIST: CPT

## 2021-05-25 PROCEDURE — 74011250637 HC RX REV CODE- 250/637: Performed by: ANESTHESIOLOGY

## 2021-05-25 PROCEDURE — 74011250636 HC RX REV CODE- 250/636: Performed by: SURGERY

## 2021-05-25 PROCEDURE — 77030040922 HC BLNKT HYPOTHRM STRY -A

## 2021-05-25 PROCEDURE — 2709999900 HC NON-CHARGEABLE SUPPLY: Performed by: SURGERY

## 2021-05-25 PROCEDURE — 77030031139 HC SUT VCRL2 J&J -A: Performed by: SURGERY

## 2021-05-25 PROCEDURE — 74011000250 HC RX REV CODE- 250: Performed by: NURSE ANESTHETIST, CERTIFIED REGISTERED

## 2021-05-25 PROCEDURE — 76210000046 HC AMBSU PH II REC FIRST 0.5 HR: Performed by: SURGERY

## 2021-05-25 PROCEDURE — 77030010509 HC AIRWY LMA MSK TELE -A: Performed by: ANESTHESIOLOGY

## 2021-05-25 PROCEDURE — 77030011267 HC ELECTRD BLD COVD -A: Performed by: SURGERY

## 2021-05-25 PROCEDURE — 77030002996 HC SUT SLK J&J -A: Performed by: SURGERY

## 2021-05-25 PROCEDURE — 74011250636 HC RX REV CODE- 250/636: Performed by: NURSE ANESTHETIST, CERTIFIED REGISTERED

## 2021-05-25 PROCEDURE — 2709999900 HC NON-CHARGEABLE SUPPLY

## 2021-05-25 PROCEDURE — 77030002933 HC SUT MCRYL J&J -A: Performed by: SURGERY

## 2021-05-25 PROCEDURE — 77030010507 HC ADH SKN DERMBND J&J -B: Performed by: SURGERY

## 2021-05-25 PROCEDURE — 76060000062 HC AMB SURG ANES 1 TO 1.5 HR: Performed by: SURGERY

## 2021-05-25 PROCEDURE — 76210000034 HC AMBSU PH I REC 0.5 TO 1 HR: Performed by: SURGERY

## 2021-05-25 PROCEDURE — C9290 INJ, BUPIVACAINE LIPOSOME: HCPCS | Performed by: SURGERY

## 2021-05-25 PROCEDURE — 76030000001 HC AMB SURG OR TIME 1 TO 1.5: Performed by: SURGERY

## 2021-05-25 PROCEDURE — 74011250636 HC RX REV CODE- 250/636: Performed by: ANESTHESIOLOGY

## 2021-05-25 PROCEDURE — 74011000250 HC RX REV CODE- 250: Performed by: SURGERY

## 2021-05-25 PROCEDURE — 77030040361 HC SLV COMPR DVT MDII -B: Performed by: SURGERY

## 2021-05-25 RX ORDER — ACETAMINOPHEN 325 MG/1
650 TABLET ORAL ONCE
Status: COMPLETED | OUTPATIENT
Start: 2021-05-25 | End: 2021-05-25

## 2021-05-25 RX ORDER — LIDOCAINE HYDROCHLORIDE 20 MG/ML
INJECTION, SOLUTION EPIDURAL; INFILTRATION; INTRACAUDAL; PERINEURAL AS NEEDED
Status: DISCONTINUED | OUTPATIENT
Start: 2021-05-25 | End: 2021-05-25 | Stop reason: HOSPADM

## 2021-05-25 RX ORDER — LIDOCAINE HYDROCHLORIDE 10 MG/ML
0.1 INJECTION, SOLUTION EPIDURAL; INFILTRATION; INTRACAUDAL; PERINEURAL AS NEEDED
Status: DISCONTINUED | OUTPATIENT
Start: 2021-05-25 | End: 2021-05-25 | Stop reason: HOSPADM

## 2021-05-25 RX ORDER — PROPOFOL 10 MG/ML
INJECTION, EMULSION INTRAVENOUS AS NEEDED
Status: DISCONTINUED | OUTPATIENT
Start: 2021-05-25 | End: 2021-05-25 | Stop reason: HOSPADM

## 2021-05-25 RX ORDER — MORPHINE SULFATE 2 MG/ML
2 INJECTION, SOLUTION INTRAMUSCULAR; INTRAVENOUS
Status: DISCONTINUED | OUTPATIENT
Start: 2021-05-25 | End: 2021-05-25 | Stop reason: HOSPADM

## 2021-05-25 RX ORDER — SODIUM CHLORIDE 0.9 % (FLUSH) 0.9 %
5-40 SYRINGE (ML) INJECTION AS NEEDED
Status: DISCONTINUED | OUTPATIENT
Start: 2021-05-25 | End: 2021-05-25 | Stop reason: HOSPADM

## 2021-05-25 RX ORDER — FENTANYL CITRATE 50 UG/ML
25 INJECTION, SOLUTION INTRAMUSCULAR; INTRAVENOUS
Status: DISCONTINUED | OUTPATIENT
Start: 2021-05-25 | End: 2021-05-25 | Stop reason: HOSPADM

## 2021-05-25 RX ORDER — DEXAMETHASONE SODIUM PHOSPHATE 4 MG/ML
INJECTION, SOLUTION INTRA-ARTICULAR; INTRALESIONAL; INTRAMUSCULAR; INTRAVENOUS; SOFT TISSUE AS NEEDED
Status: DISCONTINUED | OUTPATIENT
Start: 2021-05-25 | End: 2021-05-25 | Stop reason: HOSPADM

## 2021-05-25 RX ORDER — SODIUM CHLORIDE, SODIUM LACTATE, POTASSIUM CHLORIDE, CALCIUM CHLORIDE 600; 310; 30; 20 MG/100ML; MG/100ML; MG/100ML; MG/100ML
25 INJECTION, SOLUTION INTRAVENOUS CONTINUOUS
Status: DISCONTINUED | OUTPATIENT
Start: 2021-05-25 | End: 2021-05-25 | Stop reason: HOSPADM

## 2021-05-25 RX ORDER — DIPHENHYDRAMINE HYDROCHLORIDE 50 MG/ML
12.5 INJECTION, SOLUTION INTRAMUSCULAR; INTRAVENOUS AS NEEDED
Status: DISCONTINUED | OUTPATIENT
Start: 2021-05-25 | End: 2021-05-25 | Stop reason: HOSPADM

## 2021-05-25 RX ORDER — LIDOCAINE HYDROCHLORIDE 20 MG/ML
INJECTION, SOLUTION EPIDURAL; INFILTRATION; INTRACAUDAL; PERINEURAL AS NEEDED
Status: DISCONTINUED | OUTPATIENT
Start: 2021-05-25 | End: 2021-05-25

## 2021-05-25 RX ORDER — SODIUM CHLORIDE 0.9 % (FLUSH) 0.9 %
5-40 SYRINGE (ML) INJECTION EVERY 8 HOURS
Status: DISCONTINUED | OUTPATIENT
Start: 2021-05-25 | End: 2021-05-25 | Stop reason: HOSPADM

## 2021-05-25 RX ORDER — PHENYLEPHRINE HCL IN 0.9% NACL 0.4MG/10ML
SYRINGE (ML) INTRAVENOUS AS NEEDED
Status: DISCONTINUED | OUTPATIENT
Start: 2021-05-25 | End: 2021-05-25 | Stop reason: HOSPADM

## 2021-05-25 RX ORDER — MIDAZOLAM HYDROCHLORIDE 1 MG/ML
INJECTION, SOLUTION INTRAMUSCULAR; INTRAVENOUS AS NEEDED
Status: DISCONTINUED | OUTPATIENT
Start: 2021-05-25 | End: 2021-05-25 | Stop reason: HOSPADM

## 2021-05-25 RX ORDER — HYDROMORPHONE HYDROCHLORIDE 1 MG/ML
0.2 INJECTION, SOLUTION INTRAMUSCULAR; INTRAVENOUS; SUBCUTANEOUS
Status: DISCONTINUED | OUTPATIENT
Start: 2021-05-25 | End: 2021-05-25 | Stop reason: HOSPADM

## 2021-05-25 RX ORDER — SODIUM CHLORIDE, SODIUM LACTATE, POTASSIUM CHLORIDE, CALCIUM CHLORIDE 600; 310; 30; 20 MG/100ML; MG/100ML; MG/100ML; MG/100ML
125 INJECTION, SOLUTION INTRAVENOUS CONTINUOUS
Status: DISCONTINUED | OUTPATIENT
Start: 2021-05-25 | End: 2021-05-25 | Stop reason: HOSPADM

## 2021-05-25 RX ORDER — MIDAZOLAM HYDROCHLORIDE 1 MG/ML
1 INJECTION, SOLUTION INTRAMUSCULAR; INTRAVENOUS AS NEEDED
Status: DISCONTINUED | OUTPATIENT
Start: 2021-05-25 | End: 2021-05-25 | Stop reason: HOSPADM

## 2021-05-25 RX ORDER — ONDANSETRON 2 MG/ML
4 INJECTION INTRAMUSCULAR; INTRAVENOUS AS NEEDED
Status: DISCONTINUED | OUTPATIENT
Start: 2021-05-25 | End: 2021-05-25 | Stop reason: HOSPADM

## 2021-05-25 RX ORDER — FENTANYL CITRATE 50 UG/ML
50 INJECTION, SOLUTION INTRAMUSCULAR; INTRAVENOUS AS NEEDED
Status: DISCONTINUED | OUTPATIENT
Start: 2021-05-25 | End: 2021-05-25 | Stop reason: HOSPADM

## 2021-05-25 RX ORDER — OXYCODONE HYDROCHLORIDE 5 MG/1
5 TABLET ORAL AS NEEDED
Status: DISCONTINUED | OUTPATIENT
Start: 2021-05-25 | End: 2021-05-25 | Stop reason: HOSPADM

## 2021-05-25 RX ORDER — ONDANSETRON 2 MG/ML
INJECTION INTRAMUSCULAR; INTRAVENOUS AS NEEDED
Status: DISCONTINUED | OUTPATIENT
Start: 2021-05-25 | End: 2021-05-25 | Stop reason: HOSPADM

## 2021-05-25 RX ORDER — SODIUM CHLORIDE, SODIUM LACTATE, POTASSIUM CHLORIDE, CALCIUM CHLORIDE 600; 310; 30; 20 MG/100ML; MG/100ML; MG/100ML; MG/100ML
INJECTION, SOLUTION INTRAVENOUS
Status: DISCONTINUED | OUTPATIENT
Start: 2021-05-25 | End: 2021-05-25 | Stop reason: HOSPADM

## 2021-05-25 RX ORDER — FENTANYL CITRATE 50 UG/ML
INJECTION, SOLUTION INTRAMUSCULAR; INTRAVENOUS AS NEEDED
Status: DISCONTINUED | OUTPATIENT
Start: 2021-05-25 | End: 2021-05-25 | Stop reason: HOSPADM

## 2021-05-25 RX ORDER — MIDAZOLAM HYDROCHLORIDE 1 MG/ML
0.5 INJECTION, SOLUTION INTRAMUSCULAR; INTRAVENOUS
Status: DISCONTINUED | OUTPATIENT
Start: 2021-05-25 | End: 2021-05-25 | Stop reason: HOSPADM

## 2021-05-25 RX ORDER — CLINDAMYCIN PHOSPHATE 600 MG/50ML
600 INJECTION INTRAVENOUS ONCE
Status: COMPLETED | OUTPATIENT
Start: 2021-05-25 | End: 2021-05-25

## 2021-05-25 RX ORDER — SODIUM CHLORIDE 9 MG/ML
25 INJECTION, SOLUTION INTRAVENOUS CONTINUOUS
Status: DISCONTINUED | OUTPATIENT
Start: 2021-05-25 | End: 2021-05-25 | Stop reason: HOSPADM

## 2021-05-25 RX ADMIN — ONDANSETRON HYDROCHLORIDE 4 MG: 2 INJECTION, SOLUTION INTRAMUSCULAR; INTRAVENOUS at 07:37

## 2021-05-25 RX ADMIN — CLINDAMYCIN PHOSPHATE 600 MG: 600 INJECTION, SOLUTION INTRAVENOUS at 07:43

## 2021-05-25 RX ADMIN — FENTANYL CITRATE 50 MCG: 50 INJECTION, SOLUTION INTRAMUSCULAR; INTRAVENOUS at 07:45

## 2021-05-25 RX ADMIN — SODIUM CHLORIDE, POTASSIUM CHLORIDE, SODIUM LACTATE AND CALCIUM CHLORIDE 25 ML/HR: 600; 310; 30; 20 INJECTION, SOLUTION INTRAVENOUS at 06:47

## 2021-05-25 RX ADMIN — ACETAMINOPHEN 650 MG: 325 TABLET ORAL at 06:40

## 2021-05-25 RX ADMIN — DEXAMETHASONE SODIUM PHOSPHATE 4 MG: 4 INJECTION, SOLUTION INTRAMUSCULAR; INTRAVENOUS at 07:37

## 2021-05-25 RX ADMIN — SODIUM CHLORIDE, POTASSIUM CHLORIDE, SODIUM LACTATE AND CALCIUM CHLORIDE: 600; 310; 30; 20 INJECTION, SOLUTION INTRAVENOUS at 07:30

## 2021-05-25 RX ADMIN — Medication 80 MCG: at 08:16

## 2021-05-25 RX ADMIN — FENTANYL CITRATE 50 MCG: 50 INJECTION, SOLUTION INTRAMUSCULAR; INTRAVENOUS at 08:37

## 2021-05-25 RX ADMIN — LIDOCAINE HYDROCHLORIDE 100 MG: 20 INJECTION, SOLUTION EPIDURAL; INFILTRATION; INTRACAUDAL; PERINEURAL at 07:37

## 2021-05-25 RX ADMIN — Medication 80 MCG: at 08:07

## 2021-05-25 RX ADMIN — PROPOFOL 200 MCG: 10 INJECTION, EMULSION INTRAVENOUS at 07:37

## 2021-05-25 RX ADMIN — MIDAZOLAM 2 MG: 1 INJECTION INTRAMUSCULAR; INTRAVENOUS at 07:30

## 2021-05-25 NOTE — INTERVAL H&P NOTE
Update History & Physical 
 
The Patient's History and Physical of 5/10/2021 Right breast re-excision lumpectomy was reviewed with the patient and I examined the patient. There was no change. The surgical site was confirmed by the patient and me. Plan:  The risk, benefits, expected outcome, and alternative to the recommended procedure have been discussed with the patient. Patient understands and wants to proceed with the procedure.  
 
Electronically signed by Albert Sawant MD on 5/25/2021 at 7:19 AM

## 2021-05-25 NOTE — OP NOTES
[facility] [place] 2[place]  OPERATIVE REPORT    Name:  Mary Cordero  MR#:  [id number]  :  1955  ACCOUNT #:  [de-identified]  DATE OF SERVICE:  2021      PREOPERATIVE DIAGNOSIS:  Right breast ductal carcinoma in situ, positive margins. POSTOPERATIVE DIAGNOSIS:  Right breast ductal carcinoma in situ, positive margins. PROCEDURE PERFORMED:  Right breast reexcision lumpectomy. SURGEON:  Baltazar Diop MD    ASSISTANT:  Albino Stern SA    ANESTHESIA:  General.    COMPLICATIONS:  None. SPECIMENS REMOVED:  1. Superior margin. 2.  Medial margin. 3.  Posterior margin. IMPLANTS:  None. ESTIMATED BLOOD LOSS:  Minimal.    DRAINS:  None. FINDINGS:  Margins re-excised. INDICATIONS FOR PROCEDURE:  This is a 41-year-old female with DCIS found on the right breast on an excisional biopsy for atypical papilloma. She had three positive margins and needed these excised. PROCEDURE IN DETAIL:  The patient was seen in the preoperative holding area. Surgical site was marked by surgeon. Informed consent was obtained. She was taken to the operating room and laid in supine position where LMA anesthesia was induced. Right breast prepped and draped in the usual fashion. Time-out was performed. The prior periareolar incision was opened with a 15-blade. Bovie cautery was used to dissect into the seroma cavity. The superior medial and posterior margins were excised sharply with curved Kelly scissors with a stitch marking new margin and sent for permanent pathology. The cavity was irrigated. A mixture of 20 mL of EXPAREL with 20 mL of 0.25% Marcaine plain was injected in the breast tissue and skin. Deeper tissues were closed with interrupted 2-0 Vicryl, the skin with interrupted 3-0 Vicryl and 4-0 subcuticular Monocryl. Skin glue was placed on the incision. All sponge, needle, and instrument counts were correct. The patient went to Recovery in stable condition.       [address] [place] [address] MD [email or web address]  D:  05/25/2021 8:20  T:  05/25/2021 9:42  JOB #:  [id number]

## 2021-05-25 NOTE — DISCHARGE INSTRUCTIONS
Discharge Instructions from Dr. Ayaz Mcnair    · I will call you with the pathology results, typically within 1 week from today. · You may shower, but no hot tubs, swimming pools, or baths until your incision is healed. · No heavy lifting with the affected extremity (nothing greater than 5 pounds), and limit its use for the next 4-5 days. · You may use an ice pack for comfort for the next couple of days, but do not place ice directly on the skin. Rather, use a towel or clothing to serve as a barrier between skin and ice to prevent injury. · If I placed a drain, follow the drain instructions provided, especially as you keep a record of the drain output. · Follow medication instructions carefully. No aspirin, ibuprofen or aleve. · Wear surgical bra and dressing for 24 hours, then remove. Wear supportive bra at all times. · You will have bruising and swelling  · Watch for signs of infection as listed below. · Redness  · Swelling  · Drainage from the incision or from your nipple that appears infected  · Fever over 101.5 degrees for consecutive readings, or over 99.5 if you are currently undergoing chemotherapy. · Call our office (number is below) for a follow-up appointment. · If you have any problems, our phone number is [phone]      DISCHARGE SUMMARY from Nurse    Tylenol given at 6:40 am, please do not take additional Tylenol or pain medicine with Tylenol until after 10:40 am.     PATIENT INSTRUCTIONS:    After general anesthesia or intravenous sedation, for 24 hours or while taking prescription Narcotics:  · Limit your activities  · Do not drive and operate hazardous machinery  · Do not make important personal or business decisions  · Do  not drink alcoholic beverages  · If you have not urinated within 8 hours after discharge, please contact your surgeon on call.     Report the following to your surgeon:  · Excessive pain, swelling, redness or odor of or around the surgical area  · Temperature over 100.5  · Nausea and vomiting lasting longer than 4 hours or if unable to take medications  · Any signs of decreased circulation or nerve impairment to extremity: change in color, persistent  numbness, tingling, coldness or increase pain  · Any questions    What to do at Home:  Recommended activity: See surgical instructions. If you experience any of the following symptoms as noted above, please follow up with Dr. Jeanne Smith. *  Please give a list of your current medications to your Primary Care Provider. *  Please update this list whenever your medications are discontinued, doses are      changed, or new medications (including over-the-counter products) are added. *  Please carry medication information at all times in case of emergency situations. These are general instructions for a healthy lifestyle:    No smoking/ No tobacco products/ Avoid exposure to second hand smoke  Surgeon General's Warning:  Quitting smoking now greatly reduces serious risk to your health. Obesity, smoking, and sedentary lifestyle greatly increases your risk for illness    A healthy diet, regular physical exercise & weight monitoring are important for maintaining a healthy lifestyle    You may be retaining fluid if you have a history of heart failure or if you experience any of the following symptoms:  Weight gain of 3 pounds or more overnight or 5 pounds in a week, increased swelling in our hands or feet or shortness of breath while lying flat in bed. Please call your doctor as soon as you notice any of these symptoms; do not wait until your next office visit. The discharge information has been reviewed with the caregiver. The caregiver verbalized understanding.   Discharge medications reviewed with the caregiver and appropriate educational materials and side effects teaching were provided. ___________________________________________________________________________________________________________________________________      Patient Education      Bupivacaine Liposome (Exparel) - (By injection)   Why this medicine is used:   Relieves pain after surgery. Contact a nurse or doctor right away if you have:  · Chest pain, fast, pounding, slow, or uneven heartbeat, trouble breathing  · Seizures  · Lightheadedness or fainting     Common side effects:  · Pain, redness, or swelling where the needle was placed  · Headache, back pain  © 2017 [address] is for End User's use only and may not be sold, redistributed or otherwise used for commercial purposes. Patient Education   Bupivacaine Liposome (By injection)   Bupivacaine Liposome (cru-HTN-v-merlos LYE-poh-some)  Relieves pain after surgery. This medicine is a local anesthetic. Brand Name(s): Exparel   There may be other brand names for this medicine. When This Medicine Should Not Be Used: This medicine is not right for everyone. Do not use it if you had an allergic reaction to bupivacaine. How to Use This Medicine:   Injectable  · A nurse or other trained health professional will give you this medicine in a hospital. This medicine is given through a needle injected into the surgical site. Drugs and Foods to Avoid:   Ask your doctor or pharmacist before using any other medicine, including over-the-counter medicines, vitamins, and herbal products. · Tell your doctor if you are also using other numbing medicines, including other kinds of bupivacaine, such as lidocaine. You should not be given any other kind of bupivacaine for at least 4 days. Warnings While Using This Medicine:   · Tell your doctor if you are pregnant or breastfeeding, or if you have kidney disease or liver disease. · This medicine may make you dizzy or drowsy.  Do not drive or do anything that could be dangerous until you know how this medicine affects you. · This medicine should cause numbness only to the area where it is injected. It is not meant to cause you to fall asleep or become unconscious. · It may be easier to hurt yourself while your treated body area is still numb. Be careful to avoid injury until you have regained all the feeling and are no longer numb. Possible Side Effects While Using This Medicine:   Call your doctor right away if you notice any of these side effects:  · Allergic reaction: Itching or hives, swelling in your face or hands, swelling or tingling in your mouth or throat, chest tightness, trouble breathing  · Anxiety, depression, restlessness, drowsiness, ringing in your ears, blurred vision  · Chest pain, fast, pounding, slow, or uneven heartbeat, trouble breathing  · Lightheadedness, dizziness, fainting  · Nausea, vomiting, chills, metallic taste in your mouth  · Seizures, shivering, shaking, or tremors  If you notice these less serious side effects, talk with your doctor:   · Headache, back pain  · Trouble sleeping  If you notice other side effects that you think are caused by this medicine, tell your doctor. Call your doctor for medical advice about side effects. You may report side effects to FDA at 3-173-FDA-3708  © 2017 [facility] Information is for End User's use only and may not be sold, redistributed or otherwise used for commercial purposes. The above information is an  only. It is not intended as medical advice for individual conditions or treatments. Talk to your doctor, nurse or pharmacist before following any medical regimen to see if it is safe and effective for you.

## 2021-05-25 NOTE — ANESTHESIA POSTPROCEDURE EVALUATION
Post-Anesthesia Evaluation and Assessment    Patient: Sayra Green MRN: [id number]  SSN: [id number]    YOB: 1955  Age: 73 y.o. Sex: female       Cardiovascular Function/Vital Signs  Visit Vitals  /86   Pulse 72   Temp 36.4 °C (97.6 °F)   Resp 18   Ht 5' 4\" (1.626 m)   Wt 99.8 kg (220 lb)   SpO2 94%   BMI 37.76 kg/m²       Patient is status post General anesthesia for Procedure(s):  RIGHT BREAST RE EXCISION LUMPECTOMY. Nausea/Vomiting: None    Postoperative hydration reviewed and adequate. Pain:  Pain Scale 1: (P) Numeric (0 - 10) (05/25/21 0849)  Pain Intensity 1: (P) 0 (05/25/21 0849)   Managed    Neurological Status:   Neuro (WDL): Within Defined Limits (05/25/21 0625)   At baseline    Mental Status and Level of Consciousness: Alert and oriented to person, place, and time    Pulmonary Status:   O2 Device: None (05/25/21 0835)   Adequate oxygenation and airway patent    Complications related to anesthesia: None    Post-anesthesia assessment completed. No concerns    Signed By: Julianne Houser MD     May 25, 2021              Procedure(s):  RIGHT BREAST RE EXCISION LUMPECTOMY. general    <BSHSIANPOST>    INITIAL Post-op Vital signs:   Vitals Value Taken Time   /86 05/25/21 0900   Temp 36.4 °C (97.6 °F) 05/25/21 0835   Pulse 67 05/25/21 0906   Resp 20 05/25/21 0905   SpO2 95 % 05/25/21 0906   Vitals shown include unvalidated device data.

## 2021-05-25 NOTE — ROUTINE PROCESS
Patient: Miguel Christianson MRN: [id number]  SSN: [id number] YOB: 1955  Age: 73 y.o. Sex: female Patient is status post Procedure(s): RIGHT BREAST RE EXCISION LUMPECTOMY. Surgeon(s) and Role: Ana Weathers MD - Primary Local/Dose/Irrigation:   
 
             
Peripheral IV 05/25/21 Left Forearm (Active) Site Assessment Clean, dry, & intact 05/25/21 6461 Phlebitis Assessment 0 05/25/21 0646 Infiltration Assessment 0 05/25/21 0646 Dressing Status Clean, dry, & intact 05/25/21 4629 Dressing Type Transparent;Tape 05/25/21 5514 Hub Color/Line Status Blue; Infusing 05/25/21 0646 Dressing/Packing:  Incision 05/25/21 Breast Right-Dressing/Treatment:  (DERMABOND, 4X4, SURGIBRA) (05/25/21 0700) Splint/Cast:  ] Other:

## 2021-05-25 NOTE — BRIEF OP NOTE
Brief Postoperative Note    Patient: Ly Francois  YOB: 1955  MRN: [id number]    Date of Procedure: 5/25/2021     Pre-Op Diagnosis: RIGHT BREAST DCIS    Post-Op Diagnosis: Same as preoperative diagnosis. Procedure(s):  RIGHT BREAST RE EXCISION LUMPECTOMY    Surgeon(s):   Nathan Roca MD    Surgical Assistant: Surg Asst-1: Hanny Zimmerman    Anesthesia: General     Estimated Blood Loss (mL): Minimal    Complications: None    Specimens:   ID Type Source Tests Collected by Time Destination   1 : RIGHT BREAST TISSUE SUPERIOR MARGIN, STITCH ON NEW MARGIN Fresh Breast  Nathan Roca MD 5/25/2021 8484 Pathology   2 : RIGHT BREAST TISSUE MEDIAL MARGIN , STITCH ON NEW MARGIN  Yifan Breast  Nathan Roca MD 5/25/2021 1920 Pathology   3 : RIGHT BREAST TISSUE POSTERIOR MARGIN, STITCH ON NEW MARGIN Yifan Breast  Nathan Roca MD 5/25/2021 4580 Pathology        Implants: * No implants in log *    Drains: * No LDAs found *    Findings: margins re-excised     Electronically Signed by Shanika Parikh MD on 5/25/2021 at 8:17 AM  Dictated stat

## 2021-05-28 NOTE — PROGRESS NOTES
Called patient with surg path   Margins now clear  Decision rt low risk  Will still refer to med onc rad onc  Has appt with me 6/7

## 2021-06-07 ENCOUNTER — OFFICE VISIT (OUTPATIENT)
Dept: SURGERY | Age: 66
End: 2021-06-07
Payer: MEDICARE

## 2021-06-07 VITALS — BODY MASS INDEX: 37.56 KG/M2 | HEIGHT: 64 IN | WEIGHT: 220 LBS

## 2021-06-07 DIAGNOSIS — D05.11 DUCTAL CARCINOMA IN SITU (DCIS) OF RIGHT BREAST: Primary | ICD-10-CM

## 2021-06-07 PROCEDURE — 99024 POSTOP FOLLOW-UP VISIT: CPT | Performed by: SURGERY

## 2021-06-07 NOTE — LETTER
6/9/2021    Patient: Manjula Messina   YOB: 1955   Date of Visit: 6/7/2021     Marc Simmons MD  [address] [facility]  [address]  [address] [facility]  Via Fax: [phone]    Dear Marc Simmons MD,      Thank you for referring Ms. Alise Jones to [address] MAIN OFFICE [address] [facility] for evaluation. My notes for this consultation are attached. If you have questions, please do not hesitate to call me. I look forward to following your patient along with you.       Sincerely,    Kieran Lewis MD

## 2021-06-07 NOTE — PROGRESS NOTES
HISTORY OF PRESENT ILLNESS  Larissa Jackson is a 73 y.o. female. HPI ESTABLISHED patient here for post op RIGHT breast re excision lumpectomy 5-25-21. She is still having a small amount of pain but it has been getting better. Pathology-   Margins now clear  Decision rt low risk    Breast cancer history:  4/27/21 - RIGHT breast excisional biopsy/lumpectomy -  Had papillomas excised  Found dcis  DCISRT pending  Needs re-excision lumpectomy      1/29/2021 - RIGHT breast biopsy revealed atypical intraductal papillary lesion. History of RIGHT breast biopsy done in 2019, benign per patient. Review of Systems   All other systems reviewed and are negative. Review of Systems   All other systems reviewed and are negative. Physical Exam  Vitals and nursing note reviewed. Chest:          Comments: Right periareolar incision healing well         ASSESSMENT and PLAN    ICD-10-CM ICD-9-CM    1. Ductal carcinoma in situ (DCIS) of right breast  D05.11 233.0      72 yo female with dcis right breast stage 0   Found on excisional biopsy for atypia and papillomas.    Margins now clear  Refer to med onc, rad onc  F/u in 4-6 months with np

## 2021-06-08 DIAGNOSIS — D05.11 DUCTAL CARCINOMA IN SITU OF RIGHT BREAST: Primary | ICD-10-CM

## 2021-06-16 ENCOUNTER — OFFICE VISIT (OUTPATIENT)
Dept: ONCOLOGY | Age: 66
End: 2021-06-16
Payer: COMMERCIAL

## 2021-06-16 VITALS
RESPIRATION RATE: 18 BRPM | TEMPERATURE: 98.2 F | OXYGEN SATURATION: 98 % | DIASTOLIC BLOOD PRESSURE: 94 MMHG | WEIGHT: 218 LBS | SYSTOLIC BLOOD PRESSURE: 160 MMHG | HEART RATE: 89 BPM | BODY MASS INDEX: 37.42 KG/M2

## 2021-06-16 DIAGNOSIS — D05.11 DUCTAL CARCINOMA IN SITU (DCIS) OF RIGHT BREAST: Primary | ICD-10-CM

## 2021-06-16 DIAGNOSIS — E88.09 AROMATASE DEFICIENCY: ICD-10-CM

## 2021-06-16 DIAGNOSIS — M84.68XA PATHOLOGICAL FRACTURE IN OTHER DISEASE, OTHER SITE, INITIAL ENCOUNTER FOR FRACTURE: ICD-10-CM

## 2021-06-16 PROCEDURE — G8417 CALC BMI ABV UP PARAM F/U: HCPCS | Performed by: INTERNAL MEDICINE

## 2021-06-16 PROCEDURE — G8536 NO DOC ELDER MAL SCRN: HCPCS | Performed by: INTERNAL MEDICINE

## 2021-06-16 PROCEDURE — 1090F PRES/ABSN URINE INCON ASSESS: CPT | Performed by: INTERNAL MEDICINE

## 2021-06-16 PROCEDURE — G8510 SCR DEP NEG, NO PLAN REQD: HCPCS | Performed by: INTERNAL MEDICINE

## 2021-06-16 PROCEDURE — G8400 PT W/DXA NO RESULTS DOC: HCPCS | Performed by: INTERNAL MEDICINE

## 2021-06-16 PROCEDURE — 1101F PT FALLS ASSESS-DOCD LE1/YR: CPT | Performed by: INTERNAL MEDICINE

## 2021-06-16 PROCEDURE — G0463 HOSPITAL OUTPT CLINIC VISIT: HCPCS | Performed by: INTERNAL MEDICINE

## 2021-06-16 PROCEDURE — G9899 SCRN MAM PERF RSLTS DOC: HCPCS | Performed by: INTERNAL MEDICINE

## 2021-06-16 PROCEDURE — 3017F COLORECTAL CA SCREEN DOC REV: CPT | Performed by: INTERNAL MEDICINE

## 2021-06-16 PROCEDURE — 99204 OFFICE O/P NEW MOD 45 MIN: CPT | Performed by: INTERNAL MEDICINE

## 2021-06-16 PROCEDURE — G8427 DOCREV CUR MEDS BY ELIG CLIN: HCPCS | Performed by: INTERNAL MEDICINE

## 2021-06-16 RX ORDER — ANASTROZOLE 1 MG/1
1 TABLET ORAL DAILY
Qty: 30 TABLET | Refills: 2 | Status: SHIPPED | OUTPATIENT
Start: 2021-06-16 | End: 2021-09-27

## 2021-06-16 NOTE — PATIENT INSTRUCTIONS
Anastrozole  (sz-mar-gmny-zohl)  Trade/other name(s): Arimidex  Why would this drug be used? Anastrozole is used to treat breast cancer in women who have already gone through menopause and no longer have menstrual periods (postmenopausal women.)  How does this drug work? Anastrozole belongs to a group of drugs called aromatase inhibitors. It helps keep the body from making estrogen without affecting other hormones. Breast cancers that need estrogen to grow may stop growing or decrease in size. Before taking this medicine  Tell your doctor  If you are allergic to anything, including medicines, dyes, additives, or foods. If you have any serious medical conditions, such as high cholesterol, heart disease, or liver disease (including cirrhosis)  If you are pregnant, trying to get pregnant, or if there is any chance of pregnancy. This drug may cause birth defects if either the male or female is taking it at the time of conception or during pregnancy. Check with your doctor about what kinds of birth control can be used with this medicine. If you are breast-feeding. It is not known whether this drug passes into breast milk. If it does, it could harm the baby. If you think you might want to have children in the future. This drug has only been tested in women who have gone through menopause, and it may reduce fertility in those who have not. Talk with your doctor about the possible risk with this drug and options that may preserve your ability to have children. About any other prescription or over-the-counter medicines you are taking, including vitamins and herbs. In fact, keeping a written list of each of these medicines (including the doses of each and when you take them) with you in case of emergency may help prevent complications if you get sick. Interactions with other drugs  Medicines that contain estrogen may reduce the action of anastrozole. Tamoxifen may also reduce its effects.    Check with your doctor, nurse, or pharmacist about other medicines, vitamins, herbs, and supplements, and whether alcohol can cause problems with this medicine. Interactions with foods  No serious interactions with food are known at this time. Check with your doctor, nurse, or pharmacist about whether foods may be a problem. Tell all the doctors, dentists, nurses, and pharmacists you visit that you are taking this drug. How is this drug taken or given? Anastrozole is a pill and should be taken once a day, about the same time each day. It can be taken with or without food. The dose is the same for all adults. Take this drug exactly as your doctor tells you to. If you do not understand the instructions, your doctor or nurse can explain them to you. Store the medicine in a tightly closed container and away from children and pets. Precautions  It is important to keep taking this drug, even if you feel well. If you are bothered by side effects, talk to your doctor or nurse to find out if the problems are serious. Many side effects can be managed with help from your doctor. Very rarely, this drug may cause blood clots. This can take the form of clots in arms or legs (deep vein thrombosis), heart attack, stroke, or a blockage in the lungs. Call your doctor or nurse right away if you notice pain in your lower leg (calf), redness or swelling of your arm or leg, shortness of breath, chest pain, or trouble speaking or moving. Possible side effects  You will probably not have most of the following side effects, but if you have any talk to your doctor or nurse. They can help you understand the side effects and cope with them.    Common  weakness   lower energy level   Less common  headache   nausea   mild diarrhea   increased or decreased appetite   sweating   hot flashes   vaginal dryness   pain in bones and joints  increased osteoporosis (thinning bones)  higher risk of broken bones (fracture)  thinning hair  mood disturbances  Rare  blood clots with redness or mild swelling of arms, legs and ankles, pain in leg or calf, shortness of breath, pain in the chest, trouble moving or speaking*   allergic reaction with itchy welts, swelling mouth or throat, and trouble breathing  *See \"Precautions\" section for more detailed information. There are other side effects not listed above that can also occur in some patients. Tell your doctor or nurse if you develop these or any other problems. FDA approval  Yes - first approved in 1995   Disclaimer: This information does not cover all possible uses, actions, precautions, side effects, or interactions. It is not intended as medical advice, and should not be relied upon as a substitute for talking with your doctor, who is familiar with your medical needs.    Last Medical Review: 12/02/2009  Last Revised: 12/02/2009

## 2021-06-16 NOTE — PROGRESS NOTES
Clement Soriano is a 73 y.o. female    Chief Complaint   Patient presents with    New Patient     Breast Neoplasm       1. Have you been to the ER, urgent care clinic since your last visit? Hospitalized since your last visit? No    2. Have you seen or consulted any other health care providers outside of the [address] [place] [address] since your last visit? Include any pap smears or colon screening.  No

## 2021-06-16 NOTE — PROGRESS NOTES
[facility] [place] [address] [address], [address]  W: [phone]  F: [phone]    Reason for Visit:   Patti Vo is a 73 y.o. female who is seen in consultation at the request of Dr. Jason Pradhan for evaluation of R breast DCIS    Treatment History:   · 4/27/2021: Right breast partial mastectomy- DCIS, 11 mm, grade 1, margin < 1 mm, ER 99%, [place] 95%  · 5/25/2021: Re excision- LCIS, intraductal papilloma, no residual DCIS    History of Present Illness:   Patient is a 73 y.o. female who had an abnormal Mammogram in Jan 2021. This showed a R breast 1.1 cm complex mass in the R breast. Biopsy showed Atypical papillary lesion/ papilloma. She saw Dr. Jason Pradhan and had a breast MRI which showed a   0.7 cm circumscribed mass in the right breast. She had a partial mastectomy for the papillomas and was found to have DCIS. Had a reexcision as above and had a focus of LCIS excised. She now comes to review management. She is healing well. She comes with her sister Jacy Dorado. No cough. No SOB. No palpitations. No itching. No rash. No numbness or tingling. No swelling. No headache. No urinary pain. No urinary frequency. No incontinence. No rectal bleeding. Normal appetite. No bleeding. No constipation. No diarrhea. No fatigue. No hair loss. No fever. No chills. No nausea. No vomiting. No hot flashes. No insomnia. No anxiety, agitation, or depression. No pain. No mouth sores.     Past Medical History:   Diagnosis Date    DCIS (ductal carcinoma in situ) of breast     RIGHT      Past Surgical History:   Procedure Laterality Date    HX BREAST BIOPSY Right 4/27/2021    RIGHT BREAST BIOPSY TIMES TWO WITH MAGSEED performed by Cuco Gómez MD at [facility] AMBULATORY OR    HX BREAST LUMPECTOMY Right 5/25/2021    RIGHT BREAST RE EXCISION LUMPECTOMY performed by Cuco Gómez MD at [address] [place] [address] right breast       Social History     Tobacco Use    Smoking status: Never Smoker    Smokeless tobacco: Never Used   Substance Use Topics    Alcohol use: Yes     Comment: OCCA      Family History   Problem Relation Age of Onset    Hypertension Mother     Cancer Father         lung    Hypertension Father     Hypertension Sister     Hypertension Brother     Hypertension Brother     No Known Problems Sister     No Known Problems Son     No Known Problems Son     No Known Problems Son     Anesth Problems Neg Hx      Current Outpatient Medications   Medication Sig    ondansetron (ZOFRAN ODT) 4 mg disintegrating tablet Take 1 Tab by mouth every eight (8) hours as needed for Nausea or Vomiting.  multivitamin (ONE A DAY) tablet Take 1 Tab by mouth daily.  cholecalciferol, vitamin D3, (VITAMIN D3 PO) Take 1 Tab by mouth daily. No current facility-administered medications for this visit. Allergies   Allergen Reactions    Pcn [Penicillins] Rash and Swelling    Seafood Rash and Itching        Review of Systems: A complete review of systems was obtained, negative except as described above. Physical Exam:     Visit Vitals  BP (!) 160/94 (BP 1 Location: Left upper arm, BP Patient Position: Sitting)   Pulse 89   Temp 98.2 °F (36.8 °C)   Resp 18   Wt 218 lb (98.9 kg)   SpO2 98%   BMI 37.42 kg/m²     ECOG PS: 0  General: No distress  Eyes: PERRLA, anicteric sclerae  HENT: Atraumatic  Neck: Supple  Lymphatic: No cervical, supraclavicular, or inguinal adenopathy  Respiratory: normal respiratory effort  CV: Normal rate, no peripheral edema  GI: Nondistended  MS: Normal gait and station. Digits without clubbing or cyanosis. Skin: No rashes, ecchymoses, or petechiae. Normal temperature, turgor, and texture.   Psych: Alert, oriented, appropriate affect, normal judgment/insight    Results:     Lab Results   Component Value Date/Time    WBC 5.8 04/19/2021 02:42 PM    HGB 12.4 04/19/2021 02:42 PM    HCT 39.5 04/19/2021 02:42 PM    PLATELET 356 26/60/5707 02:42 PM    MCV 93.8 04/19/2021 02:42 PM    ABS. NEUTROPHILS 3.7 04/19/2021 02:42 PM     No results found for: NA, K, CL, CO2, GLU, BUN, CREA, GFRAA, GFRNA, CA, NAPOC, KPOCT, CLPOC, GLUCPOC, IBUN, CREAPOC, ICAI  No results found for: TBILI, ALT, AP, TP, ALB, GLOB      Records reviewed and summarized above. Pathology report(s) reviewed above. Radiology report(s) reviewed above. Assessment:   1) DCIS- R breast    S/P Partial mastectomy and re excision 4/2021  Grade 1, margins negative, LCIS co existing and excised  ER+MI+  Decision RT score is low  She has declined RT       The risk of invasive+ non invasive events following a diagnosis of DCIS is variable, risk of contralateral disease is 3-10%. Addition of endocrine therapy in HR+ DCIS further decreases the risk of recurrent breast events by about 30-40% (including contralateral breast events). The use of tamoxifen in DCIS has been studied by the NSABP in B-24. At 7 years of followup, the addition of tamoxifen to [facility] significantly improved disease free survival from 77.1% to 83.0% (p = .002), primarily through a reduction in the incidence of invasive and noninvasive breast cancer events in the ipsilateral and contralateral breast. The cumulative incidence of all ipsilateral and contralateral breast cancer events was reduced from 16% in the placebo group to 10% in the tamoxifen group (p = .0003). The incidence of invasive breast cancer events was reduced by 45% (p = .0009) and the incidence of non-invasive events was decreased 27% (p = .11). The cumulative incidence of contralateral events was reduced from 4.9% to 2.3% (p = .01). NSABP B-35 which compared 5 years of Tamoxifen versus that of Anastrazole in postmenopausal women following BCT for DCIS showed that compared to Tamoxifen,  Anastrazole reduced the incidence of breast events by ~ 30% and of invasive breast cancer by about 40%.  The benefits were observed in women < 60 y/o . It was reiterated that adjuvant endocrine therapy does not improve OS. Anastrazole may lead to myalgias, arthralgias, osteoporosis / fractures and cardiovascular disease. If she has side effects though with her being 73 one could consider switching to tamoxifen or stopping at any point    She wants to think about this    2) Obesity    3) Psychosocial  Comes with sister Link Garvey  Works with partner MD  See OCM tools    Plan:     · She has declined RT  · VD and Ca  · DEXA  · Arimidex- start if you decide to do so with plans to continue if tolerated for 5 years  · Mammogram yearly  · See Dr. Sherry Herbert as scheduled  · RTC 3 months    I appreciate the opportunity to participate in Ms. Ruby Jones's care.     Signed By: Saurav Keen MD

## 2021-06-25 ENCOUNTER — HOSPITAL ENCOUNTER (OUTPATIENT)
Dept: RADIATION THERAPY | Age: 66
Discharge: HOME | End: 2021-06-25

## 2021-08-02 ENCOUNTER — DOCUMENTATION ONLY (OUTPATIENT)
Dept: ONCOLOGY | Age: 66
End: 2021-08-02

## 2021-08-02 NOTE — PROGRESS NOTES
[address]  Social Work Navigator Encounter     Patient Name:  Vanessa Booker. Copling     Medical History: Right Breast DCIS     Advance Directives: Patient does not have an advanced medical directive, and did not express interest in completing one today. Narrative:   Missed the patient during her initial office visit on 6/16/2021. Called W#[phone], to introduce self and role, and offer support. Left a voicemail message; awaiting a call back. Barriers to Care:  No assessment completed yet. Plan:  No assessment completed yet. Referral:   No assessment completed yet.      Eduardo Raphael, ELVAW

## 2021-09-30 ENCOUNTER — HOSPITAL ENCOUNTER (OUTPATIENT)
Dept: PREADMISSION TESTING | Age: 66
Discharge: HOME | End: 2021-09-30
Payer: COMMERCIAL

## 2021-09-30 ENCOUNTER — TRANSCRIBE ORDER (OUTPATIENT)
Dept: REGISTRATION | Age: 66
End: 2021-09-30

## 2021-09-30 DIAGNOSIS — Z01.812 PRE-PROCEDURE LAB EXAM: ICD-10-CM

## 2021-09-30 DIAGNOSIS — Z01.812 PRE-PROCEDURE LAB EXAM: Primary | ICD-10-CM

## 2021-09-30 PROCEDURE — U0005 INFEC AGEN DETEC AMPLI PROBE: HCPCS

## 2021-10-01 LAB
SARS-COV-2, XPLCVT: NOT DETECTED
SOURCE, COVRS: NORMAL

## 2021-10-12 ENCOUNTER — OFFICE VISIT (OUTPATIENT)
Dept: SURGERY | Age: 66
End: 2021-10-12
Payer: COMMERCIAL

## 2021-10-12 VITALS — HEIGHT: 64 IN | BODY MASS INDEX: 37.22 KG/M2 | WEIGHT: 218 LBS

## 2021-10-12 DIAGNOSIS — Z85.3 HISTORY OF BREAST CANCER IN FEMALE: ICD-10-CM

## 2021-10-12 DIAGNOSIS — D05.11 DUCTAL CARCINOMA IN SITU (DCIS) OF RIGHT BREAST: Primary | ICD-10-CM

## 2021-10-12 DIAGNOSIS — Z98.890 S/P LUMPECTOMY OF BREAST: ICD-10-CM

## 2021-10-12 PROCEDURE — 99213 OFFICE O/P EST LOW 20 MIN: CPT | Performed by: NURSE PRACTITIONER

## 2021-10-12 NOTE — PROGRESS NOTES
HISTORY OF PRESENT ILLNESS  Mona Valdivia is a 73 y.o. female. HPI Established patient presents for follow-up to RIGHT breast DCIS. Denies breast mass, skin changes, nipple discharge and pain. Breast history -   Referring - Dr. Hina Olivares - 607/704 Sharath Yang  History of RIGHT breast biopsy done in 2019, benign per patient. 1/29/2021 - RIGHT breast biopsy revealed atypical intraductal papillary lesion. 4/27/2021: Right breast partial mastectomy- DCIS, 11 mm, grade 1, margin < 1 mm, ER 99%, [place] 95%  5/25/2021: Re excision- LCIS, intraductal papilloma, no residual DCIS  1/29/2021 - RIGHT breast biopsy revealed atypical intraductal papillary lesion. No XRT  Consult with Dr. Anjelica Bedoya - decided not to take AI      OB History    No obstetric history on file. Obstetric Comments   Menarche:  16. LMP: ? .  # of Children:  3. Age at Delivery of First Child:  23.   Hysterectomy/oophorectomy:  NO/NO. Breast Bx:  Yes right breast 2019. Hx of Breast Feeding:  no. BCP:  yes. Hormone therapy:  no.                    Past Surgical History:   Procedure Laterality Date    HX BREAST BIOPSY Right 4/27/2021    RIGHT BREAST BIOPSY TIMES TWO WITH MAGSEED performed by Elaina Chow MD at [address] [place] [address] OR    HX BREAST LUMPECTOMY Right 5/25/2021    RIGHT BREAST RE EXCISION LUMPECTOMY performed by Elaina Chow MD at [address] [place] [address] OR    HX [facility]      right breast        ROS    Physical Exam  Constitutional:       Appearance: Normal appearance. Chest:      Breasts:         Right: No mass, nipple discharge, skin change (well healed surgical incision) or tenderness. Left: No mass, nipple discharge, skin change or tenderness. Musculoskeletal:      Comments: FROM - UE x 2   Lymphadenopathy:      Upper Body:      Right upper body: No supraclavicular or axillary adenopathy. Left upper body: No supraclavicular or axillary adenopathy.    Neurological: Mental Status: She is alert. Psychiatric:         Attention and Perception: Attention normal.         Mood and Affect: Mood normal.         Speech: Speech normal.         Behavior: Behavior normal.         Visit Vitals  Ht 5' 4\" (1.626 m)   Wt 218 lb (98.9 kg)   BMI 37.42 kg/m²         ASSESSMENT and PLAN  Encounter Diagnoses   Name Primary?  Ductal carcinoma in situ (DCIS) of right breast Yes    S/P lumpectomy of breast     History of breast cancer in female        Normal exam with no evidence of local recurrence. Had consult with Dr. Kenyatta Stone and has not started AI. Discussed benefits of AI (although does not improve overall survival) and potential side effects. She does not plan to star taking the AI at this time. BDmammogram 3D in 12/2021 at 606/706 Sharath Harvey RTC here in 6 months or sooner PRN. She is comfortable with this plan. All questions answered and she stated understanding. Total time spent for this patient - 20 minutes.

## 2022-03-18 PROBLEM — D05.11 DUCTAL CARCINOMA IN SITU (DCIS) OF RIGHT BREAST: Status: ACTIVE | Noted: 2021-06-16

## 2022-03-20 PROBLEM — E88.09 AROMATASE DEFICIENCY: Status: ACTIVE | Noted: 2021-06-16

## 2022-04-28 ENCOUNTER — OFFICE VISIT (OUTPATIENT)
Dept: SURGERY | Age: 67
End: 2022-04-28
Payer: COMMERCIAL

## 2022-04-28 VITALS
DIASTOLIC BLOOD PRESSURE: 102 MMHG | WEIGHT: 218 LBS | SYSTOLIC BLOOD PRESSURE: 179 MMHG | HEART RATE: 78 BPM | BODY MASS INDEX: 37.42 KG/M2

## 2022-04-28 DIAGNOSIS — Z85.3 HISTORY OF BREAST CANCER IN FEMALE: ICD-10-CM

## 2022-04-28 DIAGNOSIS — Z98.890 S/P LUMPECTOMY OF BREAST: ICD-10-CM

## 2022-04-28 DIAGNOSIS — D05.11 DUCTAL CARCINOMA IN SITU (DCIS) OF RIGHT BREAST: Primary | ICD-10-CM

## 2022-04-28 PROCEDURE — 99213 OFFICE O/P EST LOW 20 MIN: CPT | Performed by: NURSE PRACTITIONER

## 2022-04-28 NOTE — PROGRESS NOTES
HISTORY OF PRESENT ILLNESS  Debora aVsquez is a 78 y.o. female. HPI Established patient presents for follow-up to RIGHT breast DCIS. Denies breast mass, skin changes, nipple discharge and pain.          Breast history -   Referring - Dr. Elyse Grubbs - [phone]/803 Sharath Yang  History of RIGHT breast biopsy done in 2019, benign per patient. 1/29/2021 - RIGHT breast biopsy revealed atypical intraductal papillary lesion. 4/27/2021: Right breast partial mastectomy - DCIS, 11 mm, grade 1, margin < 1 mm, ER 99%, [place] 95%  5/25/2021: Re excision- LCIS, intraductal papilloma, no residual DCIS  1/29/2021 - RIGHT breast biopsy revealed atypical intraductal papillary lesion. No XRT  Consult with Dr. Aramis Carroll - decided not to take AI        OB History    No obstetric history on file. Obstetric Comments   Menarche:  16. LMP: ? .  # of Children:  3. Age at Delivery of First Child:  23.   Hysterectomy/oophorectomy:  NO/NO. Breast Bx:  Yes right breast 2019. Hx of Breast Feeding:  no. BCP:  yes. Hormone therapy:  no.                    Past Surgical History:   Procedure Laterality Date    HX BREAST BIOPSY Right 4/27/2021    RIGHT BREAST BIOPSY TIMES TWO WITH MAGSEED performed by Deirdre Hernandez MD at [facility] AMBULATORY OR    HX BREAST LUMPECTOMY Right 5/25/2021    RIGHT BREAST RE EXCISION LUMPECTOMY performed by Deirdre Hernandez MD at [facility] AMBULATORY OR    HX [address]      right breast            ROS    Physical Exam  Constitutional:       Appearance: Normal appearance. Chest:   Breasts:      Right: No mass, nipple discharge, skin change (well healed surgical incision), tenderness, axillary adenopathy or supraclavicular adenopathy. Left: No mass, nipple discharge, skin change, tenderness, axillary adenopathy or supraclavicular adenopathy.        Musculoskeletal:      Comments: FROM - UE x 2   Lymphadenopathy:      Upper Body:      Right upper body: No supraclavicular or axillary adenopathy. Left upper body: No supraclavicular or axillary adenopathy. Neurological:      Mental Status: She is alert. Psychiatric:         Attention and Perception: Attention normal.         Mood and Affect: Mood normal.         Speech: Speech normal.         Behavior: Behavior normal.         Visit Vitals  BP (!) 179/102   Pulse 78   Wt 218 lb (98.9 kg)   BMI 37.42 kg/m²         ASSESSMENT and PLAN    ICD-10-CM ICD-9-CM    1. Ductal carcinoma in situ (DCIS) of right breast  D05.11 233.0    2. S/P lumpectomy of breast  Z98.890 V45.89    3. History of breast cancer in female  Z85.3 V10.3         Normal exam with no evidence of local recurrence. BDmammogram 3D in 1/2022 at [facility]. RDmammogram 3D in 7/2022. RTC here in 6 months or sooner PRN. She is comfortable with this plan. All questions answered and she stated understanding. Total time spent for this patient - 20 minutes.

## 2022-06-10 ENCOUNTER — OFFICE VISIT (OUTPATIENT)
Dept: CARDIOLOGY CLINIC | Age: 67
End: 2022-06-10
Payer: COMMERCIAL

## 2022-06-10 VITALS
HEIGHT: 64 IN | WEIGHT: 221 LBS | DIASTOLIC BLOOD PRESSURE: 80 MMHG | BODY MASS INDEX: 37.73 KG/M2 | SYSTOLIC BLOOD PRESSURE: 138 MMHG | OXYGEN SATURATION: 97 % | HEART RATE: 71 BPM | RESPIRATION RATE: 16 BRPM

## 2022-06-10 DIAGNOSIS — D05.11 DUCTAL CARCINOMA IN SITU (DCIS) OF RIGHT BREAST: ICD-10-CM

## 2022-06-10 DIAGNOSIS — E78.5 BORDERLINE HYPERLIPIDEMIA: ICD-10-CM

## 2022-06-10 DIAGNOSIS — I10 BENIGN ESSENTIAL HTN: ICD-10-CM

## 2022-06-10 DIAGNOSIS — I20.0 UNSTABLE ANGINA (HCC): Primary | ICD-10-CM

## 2022-06-10 PROCEDURE — G8754 DIAS BP LESS 90: HCPCS | Performed by: INTERNAL MEDICINE

## 2022-06-10 PROCEDURE — G8400 PT W/DXA NO RESULTS DOC: HCPCS | Performed by: INTERNAL MEDICINE

## 2022-06-10 PROCEDURE — G8536 NO DOC ELDER MAL SCRN: HCPCS | Performed by: INTERNAL MEDICINE

## 2022-06-10 PROCEDURE — 99204 OFFICE O/P NEW MOD 45 MIN: CPT | Performed by: INTERNAL MEDICINE

## 2022-06-10 PROCEDURE — 1101F PT FALLS ASSESS-DOCD LE1/YR: CPT | Performed by: INTERNAL MEDICINE

## 2022-06-10 PROCEDURE — 3017F COLORECTAL CA SCREEN DOC REV: CPT | Performed by: INTERNAL MEDICINE

## 2022-06-10 PROCEDURE — 93000 ELECTROCARDIOGRAM COMPLETE: CPT | Performed by: INTERNAL MEDICINE

## 2022-06-10 PROCEDURE — G8752 SYS BP LESS 140: HCPCS | Performed by: INTERNAL MEDICINE

## 2022-06-10 PROCEDURE — G8417 CALC BMI ABV UP PARAM F/U: HCPCS | Performed by: INTERNAL MEDICINE

## 2022-06-10 PROCEDURE — G9899 SCRN MAM PERF RSLTS DOC: HCPCS | Performed by: INTERNAL MEDICINE

## 2022-06-10 PROCEDURE — G8510 SCR DEP NEG, NO PLAN REQD: HCPCS | Performed by: INTERNAL MEDICINE

## 2022-06-10 PROCEDURE — 1123F ACP DISCUSS/DSCN MKR DOCD: CPT | Performed by: INTERNAL MEDICINE

## 2022-06-10 PROCEDURE — G8427 DOCREV CUR MEDS BY ELIG CLIN: HCPCS | Performed by: INTERNAL MEDICINE

## 2022-06-10 PROCEDURE — 1090F PRES/ABSN URINE INCON ASSESS: CPT | Performed by: INTERNAL MEDICINE

## 2022-06-10 RX ORDER — AMLODIPINE BESYLATE 5 MG/1
TABLET ORAL
COMMUNITY
Start: 2022-05-09

## 2022-06-10 RX ORDER — OMEPRAZOLE 40 MG/1
40 CAPSULE, DELAYED RELEASE ORAL DAILY
COMMUNITY
Start: 2022-05-20

## 2022-06-10 NOTE — PROGRESS NOTES
[facility]: [place]  [phone]    History of Present Illness: Ms. Martin Palacios is a 77 yo F with a history of hypertension, borderline hyperlipidemia, referred by Dr. Jessi Foote for cardiac evaluation. She is here due to left arm pain she is worried might be heart related. It occurred approximately two weeks ago when she was sitting on the bed lasting approximately a minute with no clear exacerbating factors. Overall while it was happening, her breathing was okay. She does think she had one other time and was associated with back pain. She denies any prior cardiac history. She does note she does not exercise regularly. She does have a history of right sided breast cancer, status post mastectomy and this has been stable, followed by Dr. Sean Lucio. She is compensated on exam with clear lungs and no lower extremity edema. Her EKG here is normal sinus rhythm, nonspecific ST-T wave abnormality. Soc hx. No tobacco.   Fam hx. No early CAD  Assessment and Plan:   1. Unstable angina. Left sided pain, possible anginal equivalent and will proceed with a treadmill stress test for further evaluation. If this is unrevealing, would consider musculoskeletal etiology. 2. Essential hypertension. Blood pressure is controlled. 3. Borderline hyperlipidemia. 4. Right sided breast cancer, status post partial mastectomy. Followed closely by Oncology, Dr. Sean Lucio. She  has a past medical history of DCIS (ductal carcinoma in situ) of breast.      All other systems negative except as above. PE  Vitals:    06/10/22 1303   BP: 138/80   Pulse: 71   Resp: 16   SpO2: 97%   Weight: 221 lb (100.2 kg)   Height: 5' 4\" (1.626 m)    Body mass index is 37.93 kg/m².    General appearance - alert, well appearing, and in no distress  Mental status - affect appropriate to mood  Eyes - sclera anicteric, moist mucous membranes  Neck - supple, no JVD  Chest - clear to auscultation, no wheezes, rales or rhonchi  Heart - normal rate, regular rhythm, normal S1, S2, no murmurs, rubs, clicks or gallops  Abdomen - soft, nontender, nondistended, no masses or organomegaly  Neurological -no focal deficit  Extremities - peripheral pulses normal, no pedal edema    Recent Labs:  No results found for: CHOL, CHOLX, CHLST, CHOLV, 555261, HDL, HDLP, LDL, LDLC, DLDLP, TGLX, TRIGL, TRIGP, CHHD, CHHDX  No results found for: HANS, CREAPOC, ACREA, CREA, REFC3, REFC4  No results found for: BUN, BUNPOC, IBUN, MBUNV, BUNV  No results found for: K, KI, PLK, WBK  No results found for: HBA1C, TKO2JYOO, UOY4KDDC  Lab Results   Component Value Date/Time    HGB 12.4 04/19/2021 02:42 PM     Lab Results   Component Value Date/Time    PLATELET 585 37/39/9709 02:42 PM       Reviewed:  Past Medical History:   Diagnosis Date    DCIS (ductal carcinoma in situ) of breast     RIGHT     Social History     Tobacco Use   Smoking Status Never Smoker   Smokeless Tobacco Never Used     Social History     Substance and Sexual Activity   Alcohol Use Yes    Comment: OCCA     Allergies   Allergen Reactions    Pcn [Penicillins] Rash and Swelling    Seafood Rash and Itching       Current Outpatient Medications   Medication Sig    amLODIPine (Norvasc) 5 mg tablet     omeprazole (PRILOSEC) 40 mg capsule Take 40 mg by mouth daily. No current facility-administered medications for this visit.        Edin Rosenbaum MD  [facility] [place]  [address] [facility] [address]  [address]

## 2023-05-08 ENCOUNTER — OFFICE VISIT (OUTPATIENT)
Age: 68
End: 2023-05-08
Payer: COMMERCIAL

## 2023-05-08 VITALS — WEIGHT: 224 LBS | BODY MASS INDEX: 38.24 KG/M2 | HEIGHT: 64 IN

## 2023-05-08 DIAGNOSIS — D05.11 DUCTAL CARCINOMA IN SITU (DCIS) OF RIGHT BREAST: Primary | ICD-10-CM

## 2023-05-08 DIAGNOSIS — N64.4 MASTODYNIA: ICD-10-CM

## 2023-05-08 PROCEDURE — 1123F ACP DISCUSS/DSCN MKR DOCD: CPT | Performed by: SURGERY

## 2023-05-08 PROCEDURE — 99213 OFFICE O/P EST LOW 20 MIN: CPT | Performed by: SURGERY

## 2023-05-08 RX ORDER — AMLODIPINE BESYLATE 5 MG/1
5 TABLET ORAL DAILY
COMMUNITY

## 2023-05-08 NOTE — PROGRESS NOTES
Subjective:      Patient ID: Connie Leonard is a 80 y.o. female. HPI ESTABLISHED Patient here for bilateral breast pain. Has noticed the pain comes and goes in short periods of time. Does not seem to link to anything she is aware of.     LV c/Madeline 4/28/22    Breast history-   Referring - Dr. Kisha Vee - [phone]  History of RIGHT breast biopsy done in 2019, benign per patient. 1/29/2021 - RIGHT breast biopsy revealed atypical intraductal papillary lesion. 4/27/2021: Right breast partial mastectomy - DCIS, 11 mm, grade 1, margin < 1 mm, ER 99%, NC 95%  5/25/2021: Re excision- LCIS, intraductal papilloma, no residual DCIS  1/29/2021 - RIGHT breast biopsy revealed atypical intraductal papillary lesion.   No XRT  Consult with Dr. Winnie Hernandez - decided not to take AI    Breast imaging-         Review of Systems    Objective:   Physical Exam    Assessment:      ***      Plan:      ***        Venson Castleman, MA

## 2023-05-10 NOTE — PROGRESS NOTES
HISTORY OF PRESENT ILLNESS  Lito Fuller is a 80 y.o. female     HPI ESTABLISHED Patient here for bilateral breast pain. Has noticed the pain comes and goes in short periods of time. Does not seem to link to anything she is aware of.      LV c/Madeline 4/28/22     Breast history-   Referring - Dr. Anette Chery - [phone]  History of RIGHT breast biopsy done in 2019, benign per patient. 1/29/2021 - RIGHT breast biopsy revealed atypical intraductal papillary lesion. 4/27/2021: Right breast partial mastectomy - DCIS, 11 mm, grade 1, margin < 1 mm, ER 99%, MT 95%  5/25/2021: Re excision- LCIS, intraductal papilloma, no residual DCIS  1/29/2021 - RIGHT breast biopsy revealed atypical intraductal papillary lesion. No XRT  Consult with Dr. Daphnie Fang - decided not to take AI     Breast Imaging - due      Review of Systems   All other systems reviewed and are negative. Physical Exam  Vitals and nursing note reviewed. Chest:   Breasts:     Right: Tenderness present. No swelling, bleeding, inverted nipple, mass, nipple discharge or skin change. Left: Tenderness present. No swelling, bleeding, inverted nipple, mass, nipple discharge or skin change. Lymphadenopathy:      Upper Body:      Right upper body: No axillary adenopathy. Left upper body: No axillary adenopathy. ASSESSMENT and PLAN   Diagnosis Orders   1. Ductal carcinoma in situ (DCIS) of right breast        2. Mastodynia        - breast pain likely from radiation, msk pain  - heat, nsaids  Rtc in 1 year  Continue mammograms  20 minutes was spent with patient on counseling and coordination of care.

## 2023-05-18 RX ORDER — AMLODIPINE BESYLATE 5 MG/1
TABLET ORAL
COMMUNITY
Start: 2022-05-09

## 2023-05-18 RX ORDER — OMEPRAZOLE 40 MG/1
40 CAPSULE, DELAYED RELEASE ORAL DAILY
COMMUNITY
Start: 2022-05-20

## 2025-07-29 ENCOUNTER — OFFICE VISIT (OUTPATIENT)
Age: 70
End: 2025-07-29
Payer: MEDICARE

## 2025-07-29 DIAGNOSIS — Z85.3 HISTORY OF BREAST CANCER IN FEMALE: ICD-10-CM

## 2025-07-29 DIAGNOSIS — N60.11 FIBROCYSTIC BREAST CHANGES OF BOTH BREASTS: Primary | ICD-10-CM

## 2025-07-29 DIAGNOSIS — N60.12 FIBROCYSTIC BREAST CHANGES OF BOTH BREASTS: Primary | ICD-10-CM

## 2025-07-29 DIAGNOSIS — N64.4 BREAST PAIN: ICD-10-CM

## 2025-07-29 PROCEDURE — G8400 PT W/DXA NO RESULTS DOC: HCPCS | Performed by: NURSE PRACTITIONER

## 2025-07-29 PROCEDURE — 3017F COLORECTAL CA SCREEN DOC REV: CPT | Performed by: NURSE PRACTITIONER

## 2025-07-29 PROCEDURE — G8427 DOCREV CUR MEDS BY ELIG CLIN: HCPCS | Performed by: NURSE PRACTITIONER

## 2025-07-29 PROCEDURE — G8421 BMI NOT CALCULATED: HCPCS | Performed by: NURSE PRACTITIONER

## 2025-07-29 PROCEDURE — 1123F ACP DISCUSS/DSCN MKR DOCD: CPT | Performed by: NURSE PRACTITIONER

## 2025-07-29 PROCEDURE — 1090F PRES/ABSN URINE INCON ASSESS: CPT | Performed by: NURSE PRACTITIONER

## 2025-07-29 PROCEDURE — 99212 OFFICE O/P EST SF 10 MIN: CPT | Performed by: NURSE PRACTITIONER

## 2025-07-29 PROCEDURE — 4004F PT TOBACCO SCREEN RCVD TLK: CPT | Performed by: NURSE PRACTITIONER

## 2025-07-29 NOTE — PROGRESS NOTES
HISTORY OF PRESENT ILLNESS  Lauren Angel is a 70 y.o. female     HPI  Established patient presents for follow-up to RIGHT breast DCIS. Denies breast mass, skin changes and nipple discharge.  Reports intermittent sharp, shooting pain along the RIGHT inframammary fold.          Breast history -   Referring - Dr. eDe Dee Ortez - [facility]  History of RIGHT breast biopsy done in 2019, benign per patient.  1/29/2021 - RIGHT breast biopsy revealed atypical intraductal papillary lesion.  4/27/2021: Right breast partial mastectomy - DCIS, 11 mm, grade 1, margin < 1 mm, ER 99%, DC 95%  5/25/2021: Re excision- LCIS, intraductal papilloma, no residual DCIS  1/29/2021 - RIGHT breast biopsy revealed atypical intraductal papillary lesion.  No XRT  Consult with Dr. Lombardo - decided not to take AI         OB History    No obstetric history on file.      Obstetric Comments   Menarche 14, LMP 52, # of children 3, age of 1st delivery 26, Hysterectomy/oophorectomy No/No, Breast bx Yes, history of breast feeding No, BCP Yes, Hormone therapy No                    Past Surgical History:   Procedure Laterality Date    BREAST BIOPSY Right 4/27/2021    RIGHT BREAST BIOPSY TIMES TWO WITH MAGSEED performed by Yael Georges MD at [facility]    BREAST LUMPECTOMY Right 5/25/2021    RIGHT BREAST RE EXCISION LUMPECTOMY performed by Yael Georges MD at [facility]    BREAST SURGERY      right breast     DILATION AND CURETTAGE OF UTERUS  1998    [facility] STEREO PLACE BREAST LOC DEVICE ADDL LESION RIGHT Right 4/23/2021    [facility] STEREO GUID EA AD RIGHT 4/23/2021 [facility] RAD MAMMO         Review of Systems      Physical Exam  Constitutional:       Appearance: Normal appearance.   Chest:   Breasts:     Right: No mass, nipple discharge, skin change or tenderness.      Left: No mass, nipple discharge, skin change or tenderness.   Musculoskeletal:      Comments: FROM - UE x 2   Lymphadenopathy:      Upper Body:      Right upper body: No

## (undated) DEVICE — SOL IRR SOD CL 0.9% 1000ML BTL --

## (undated) DEVICE — DERMABOND SKIN ADH 0.7ML -- DERMABOND ADVANCED 12/BX

## (undated) DEVICE — YANKAUER,TAPERED BULBOUS TIP,W/O VENT: Brand: MEDLINE

## (undated) DEVICE — 3M™ TEGADERM™ I.V. SECUREMENT DRESSING, 9519HP, 2-3/8 IN X 2-3/8 IN (6 CM X 6 CM), 100/CT 4 CT/CASE: Brand: 3M™ TEGADERM™

## (undated) DEVICE — PENCIL SMK EVAC L10FT DIA95MM TBNG NONSTICK W ADPT TO 22MM

## (undated) DEVICE — TOWEL SURG W17XL27IN STD BLU COT NONFENESTRATED PREWASHED

## (undated) DEVICE — SUTURE VCRL SZ 2-0 L27IN ABSRB UD L26MM SH 1/2 CIR J417H

## (undated) DEVICE — INSULATED BLADE ELECTRODE: Brand: EDGE

## (undated) DEVICE — STERILE POLYISOPRENE POWDER-FREE SURGICAL GLOVES WITH EMOLLIENT COATING: Brand: PROTEXIS

## (undated) DEVICE — INFECTION CONTROL KIT SYS

## (undated) DEVICE — PREP SKN CHLRAPRP APL 26ML STR --

## (undated) DEVICE — SPONGE GZ W4XL4IN COT 12 PLY TYP VII WVN C FLD DSGN

## (undated) DEVICE — REM POLYHESIVE ADULT PATIENT RETURN ELECTRODE: Brand: VALLEYLAB

## (undated) DEVICE — SUTURE MCRYL SZ 4-0 L27IN ABSRB UD L19MM PS-2 1/2 CIR PRIM Y426H

## (undated) DEVICE — PACK,BASIC,SIRUS,V: Brand: MEDLINE

## (undated) DEVICE — HANDLE LT SNAP ON ULT DURABLE LENS FOR TRUMPF ALC DISPOSABLE

## (undated) DEVICE — SURGICAL PROCEDURE PACK BASIN MAJ SET CUST NO CAUT

## (undated) DEVICE — TUBING, SUCTION, 1/4" X 12', STRAIGHT: Brand: MEDLINE

## (undated) DEVICE — SUTURE VCRL SZ 3-0 L27IN ABSRB VLT L26MM SH 1/2 CIR J316H

## (undated) DEVICE — DRAPE,REIN 53X77,STERILE: Brand: MEDLINE

## (undated) DEVICE — SUT SLK 2-0SH 30IN BLK --

## (undated) DEVICE — SYR 10ML LUER LOK 1/5ML GRAD --

## (undated) DEVICE — DRAPE PRB US TRNSDCR 6X96IN --

## (undated) DEVICE — NEEDLE HYPO 22GA L1.5IN BLK POLYPR HUB S STL REG BVL STR

## (undated) DEVICE — DRAPE,CHEST,FENES,15X10,STERIL: Brand: MEDLINE

## (undated) DEVICE — SUTURE VCRL SZ 3-0 L27IN ABSRB UD L26MM SH 1/2 CIR J416H

## (undated) DEVICE — GARMENT,MEDLINE,DVT,INT,CALF,MED, GEN2: Brand: MEDLINE

## (undated) DEVICE — 1010 S-DRAPE TOWEL DRAPE 10/BX: Brand: STERI-DRAPE™

## (undated) DEVICE — BRA COMPR MAMM SILKY 3XL BGE

## (undated) DEVICE — SPONGE LAP 18X18IN STRL -- 5/PK